# Patient Record
Sex: MALE | Race: WHITE | NOT HISPANIC OR LATINO | Employment: UNEMPLOYED | URBAN - METROPOLITAN AREA
[De-identification: names, ages, dates, MRNs, and addresses within clinical notes are randomized per-mention and may not be internally consistent; named-entity substitution may affect disease eponyms.]

---

## 2018-04-05 ENCOUNTER — HOSPITAL ENCOUNTER (INPATIENT)
Facility: HOSPITAL | Age: 58
LOS: 3 days | Discharge: HOME | DRG: 580 | End: 2018-04-08
Attending: EMERGENCY MEDICINE | Admitting: EMERGENCY MEDICINE

## 2018-04-05 ENCOUNTER — TELEPHONE (OUTPATIENT)
Dept: UROLOGY | Facility: CLINIC | Age: 58
End: 2018-04-05

## 2018-04-05 DIAGNOSIS — L02.214 ABSCESS OF GROIN, LEFT: ICD-10-CM

## 2018-04-05 DIAGNOSIS — L03.113 CELLULITIS OF RIGHT ARM: ICD-10-CM

## 2018-04-05 DIAGNOSIS — L03.113 CELLULITIS OF RIGHT FOREARM: ICD-10-CM

## 2018-04-05 DIAGNOSIS — L02.413 ABSCESS OF RIGHT FOREARM: Primary | ICD-10-CM

## 2018-04-05 DIAGNOSIS — L03.314 CELLULITIS OF GROIN: ICD-10-CM

## 2018-04-05 PROBLEM — Z72.0 TOBACCO ABUSE: Status: ACTIVE | Noted: 2018-04-05

## 2018-04-05 PROBLEM — N40.0 ENLARGED PROSTATE: Status: ACTIVE | Noted: 2018-04-05

## 2018-04-05 PROBLEM — K76.0 HEPATIC STEATOSIS: Status: ACTIVE | Noted: 2018-04-05

## 2018-04-05 PROBLEM — B18.2 CHRONIC HEPATITIS C VIRUS INFECTION: Status: ACTIVE | Noted: 2018-04-05

## 2018-04-05 PROBLEM — I10 ESSENTIAL HYPERTENSION: Status: ACTIVE | Noted: 2018-04-05

## 2018-04-05 PROBLEM — N32.89 BLADDER WALL THICKENING: Status: ACTIVE | Noted: 2018-04-05

## 2018-04-05 LAB
ALBUMIN SERPL BCP-MCNC: 4 G/DL
ALP SERPL-CCNC: 103 U/L
ALT SERPL W/O P-5'-P-CCNC: 50 U/L
ANION GAP SERPL CALC-SCNC: 7 MMOL/L
APTT BLDCRRT: 28.1 SEC
AST SERPL-CCNC: 44 U/L
BASOPHILS # BLD AUTO: 0.01 K/UL
BASOPHILS NFR BLD: 0.1 %
BILIRUB SERPL-MCNC: 1 MG/DL
BILIRUB UR QL STRIP: NEGATIVE
BUN SERPL-MCNC: 9 MG/DL
CALCIUM SERPL-MCNC: 9.7 MG/DL
CHLORIDE SERPL-SCNC: 101 MMOL/L
CLARITY UR: CLEAR
CO2 SERPL-SCNC: 30 MMOL/L
COLOR UR: YELLOW
CREAT SERPL-MCNC: 0.8 MG/DL
CRP SERPL-MCNC: 8.7 MG/L
DIFFERENTIAL METHOD: ABNORMAL
EOSINOPHIL # BLD AUTO: 0.1 K/UL
EOSINOPHIL NFR BLD: 1.3 %
ERYTHROCYTE [DISTWIDTH] IN BLOOD BY AUTOMATED COUNT: 12.6 %
ERYTHROCYTE [SEDIMENTATION RATE] IN BLOOD BY WESTERGREN METHOD: 11 MM/HR
EST. GFR  (AFRICAN AMERICAN): >60 ML/MIN/1.73 M^2
EST. GFR  (NON AFRICAN AMERICAN): >60 ML/MIN/1.73 M^2
GLUCOSE SERPL-MCNC: 103 MG/DL
GLUCOSE UR QL STRIP: NEGATIVE
HCT VFR BLD AUTO: 45.4 %
HGB BLD-MCNC: 15.4 G/DL
HGB UR QL STRIP: NEGATIVE
HIV1+2 IGG SERPL QL IA.RAPID: NEGATIVE
INR PPP: 1
KETONES UR QL STRIP: NEGATIVE
LEUKOCYTE ESTERASE UR QL STRIP: NEGATIVE
LYMPHOCYTES # BLD AUTO: 1.6 K/UL
LYMPHOCYTES NFR BLD: 20.6 %
MCH RBC QN AUTO: 32.9 PG
MCHC RBC AUTO-ENTMCNC: 33.9 G/DL
MCV RBC AUTO: 97 FL
MONOCYTES # BLD AUTO: 0.6 K/UL
MONOCYTES NFR BLD: 7.4 %
NEUTROPHILS # BLD AUTO: 5.6 K/UL
NEUTROPHILS NFR BLD: 70.5 %
NITRITE UR QL STRIP: NEGATIVE
PH UR STRIP: 7 [PH] (ref 5–8)
PLATELET # BLD AUTO: 251 K/UL
PMV BLD AUTO: 10.1 FL
POTASSIUM SERPL-SCNC: 4.4 MMOL/L
PROT SERPL-MCNC: 8.2 G/DL
PROT UR QL STRIP: NEGATIVE
PROTHROMBIN TIME: 10.7 SEC
RBC # BLD AUTO: 4.68 M/UL
SODIUM SERPL-SCNC: 138 MMOL/L
SP GR UR STRIP: 1.01 (ref 1–1.03)
URN SPEC COLLECT METH UR: ABNORMAL
UROBILINOGEN UR STRIP-ACNC: ABNORMAL EU/DL
WBC # BLD AUTO: 7.96 K/UL

## 2018-04-05 PROCEDURE — 25500020 PHARM REV CODE 255: Performed by: EMERGENCY MEDICINE

## 2018-04-05 PROCEDURE — 25000003 PHARM REV CODE 250: Performed by: HOSPITALIST

## 2018-04-05 PROCEDURE — 87186 SC STD MICRODIL/AGAR DIL: CPT

## 2018-04-05 PROCEDURE — 96365 THER/PROPH/DIAG IV INF INIT: CPT

## 2018-04-05 PROCEDURE — 87086 URINE CULTURE/COLONY COUNT: CPT

## 2018-04-05 PROCEDURE — 25000003 PHARM REV CODE 250: Performed by: UROLOGY

## 2018-04-05 PROCEDURE — 81003 URINALYSIS AUTO W/O SCOPE: CPT

## 2018-04-05 PROCEDURE — 25000003 PHARM REV CODE 250: Performed by: PHYSICIAN ASSISTANT

## 2018-04-05 PROCEDURE — 96366 THER/PROPH/DIAG IV INF ADDON: CPT

## 2018-04-05 PROCEDURE — 96375 TX/PRO/DX INJ NEW DRUG ADDON: CPT

## 2018-04-05 PROCEDURE — 63600175 PHARM REV CODE 636 W HCPCS: Performed by: HOSPITALIST

## 2018-04-05 PROCEDURE — 85025 COMPLETE CBC W/AUTO DIFF WBC: CPT

## 2018-04-05 PROCEDURE — 85610 PROTHROMBIN TIME: CPT

## 2018-04-05 PROCEDURE — 86140 C-REACTIVE PROTEIN: CPT

## 2018-04-05 PROCEDURE — 10061 I&D ABSCESS COMP/MULTIPLE: CPT

## 2018-04-05 PROCEDURE — 87070 CULTURE OTHR SPECIMN AEROBIC: CPT

## 2018-04-05 PROCEDURE — 85652 RBC SED RATE AUTOMATED: CPT

## 2018-04-05 PROCEDURE — 99285 EMERGENCY DEPT VISIT HI MDM: CPT | Mod: 25

## 2018-04-05 PROCEDURE — 80053 COMPREHEN METABOLIC PANEL: CPT

## 2018-04-05 PROCEDURE — 85730 THROMBOPLASTIN TIME PARTIAL: CPT

## 2018-04-05 PROCEDURE — 11000001 HC ACUTE MED/SURG PRIVATE ROOM

## 2018-04-05 PROCEDURE — 63600175 PHARM REV CODE 636 W HCPCS: Performed by: PHYSICIAN ASSISTANT

## 2018-04-05 PROCEDURE — 86703 HIV-1/HIV-2 1 RESULT ANTBDY: CPT

## 2018-04-05 PROCEDURE — 83036 HEMOGLOBIN GLYCOSYLATED A1C: CPT

## 2018-04-05 PROCEDURE — 99254 IP/OBS CNSLTJ NEW/EST MOD 60: CPT | Mod: ,,, | Performed by: UROLOGY

## 2018-04-05 PROCEDURE — 87077 CULTURE AEROBIC IDENTIFY: CPT

## 2018-04-05 PROCEDURE — 87040 BLOOD CULTURE FOR BACTERIA: CPT

## 2018-04-05 PROCEDURE — S4991 NICOTINE PATCH NONLEGEND: HCPCS | Performed by: HOSPITALIST

## 2018-04-05 RX ORDER — AMOXICILLIN 250 MG
1 CAPSULE ORAL 2 TIMES DAILY
Status: DISCONTINUED | OUTPATIENT
Start: 2018-04-05 | End: 2018-04-08

## 2018-04-05 RX ORDER — ONDANSETRON 8 MG/1
8 TABLET, ORALLY DISINTEGRATING ORAL EVERY 8 HOURS PRN
Status: DISCONTINUED | OUTPATIENT
Start: 2018-04-05 | End: 2018-04-08 | Stop reason: HOSPADM

## 2018-04-05 RX ORDER — MORPHINE SULFATE 10 MG/ML
4 INJECTION INTRAMUSCULAR; INTRAVENOUS; SUBCUTANEOUS
Status: COMPLETED | OUTPATIENT
Start: 2018-04-05 | End: 2018-04-05

## 2018-04-05 RX ORDER — IBUPROFEN 200 MG
16 TABLET ORAL
Status: DISCONTINUED | OUTPATIENT
Start: 2018-04-05 | End: 2018-04-08 | Stop reason: HOSPADM

## 2018-04-05 RX ORDER — MORPHINE SULFATE 4 MG/ML
4 INJECTION, SOLUTION INTRAMUSCULAR; INTRAVENOUS EVERY 4 HOURS PRN
Status: DISCONTINUED | OUTPATIENT
Start: 2018-04-05 | End: 2018-04-06

## 2018-04-05 RX ORDER — ONDANSETRON 8 MG/1
8 TABLET, ORALLY DISINTEGRATING ORAL EVERY 8 HOURS PRN
Status: DISCONTINUED | OUTPATIENT
Start: 2018-04-05 | End: 2018-04-06

## 2018-04-05 RX ORDER — IBUPROFEN 200 MG
24 TABLET ORAL
Status: DISCONTINUED | OUTPATIENT
Start: 2018-04-05 | End: 2018-04-08 | Stop reason: HOSPADM

## 2018-04-05 RX ORDER — LIDOCAINE HYDROCHLORIDE AND EPINEPHRINE 10; 10 MG/ML; UG/ML
10 INJECTION, SOLUTION INFILTRATION; PERINEURAL
Status: COMPLETED | OUTPATIENT
Start: 2018-04-05 | End: 2018-04-05

## 2018-04-05 RX ORDER — GLUCAGON 1 MG
1 KIT INJECTION
Status: DISCONTINUED | OUTPATIENT
Start: 2018-04-05 | End: 2018-04-08 | Stop reason: HOSPADM

## 2018-04-05 RX ORDER — SODIUM CHLORIDE 0.9 % (FLUSH) 0.9 %
5 SYRINGE (ML) INJECTION
Status: DISCONTINUED | OUTPATIENT
Start: 2018-04-05 | End: 2018-04-08 | Stop reason: HOSPADM

## 2018-04-05 RX ORDER — RAMELTEON 8 MG/1
8 TABLET ORAL NIGHTLY PRN
Status: DISCONTINUED | OUTPATIENT
Start: 2018-04-05 | End: 2018-04-08 | Stop reason: HOSPADM

## 2018-04-05 RX ORDER — ACETAMINOPHEN 325 MG/1
650 TABLET ORAL EVERY 8 HOURS PRN
Status: DISCONTINUED | OUTPATIENT
Start: 2018-04-05 | End: 2018-04-08 | Stop reason: HOSPADM

## 2018-04-05 RX ORDER — KETOROLAC TROMETHAMINE 30 MG/ML
15 INJECTION, SOLUTION INTRAMUSCULAR; INTRAVENOUS EVERY 6 HOURS PRN
Status: DISCONTINUED | OUTPATIENT
Start: 2018-04-05 | End: 2018-04-08 | Stop reason: HOSPADM

## 2018-04-05 RX ORDER — VANCOMYCIN HCL IN 5 % DEXTROSE 1G/250ML
1000 PLASTIC BAG, INJECTION (ML) INTRAVENOUS
Status: COMPLETED | OUTPATIENT
Start: 2018-04-05 | End: 2018-04-05

## 2018-04-05 RX ORDER — HYDROMORPHONE HYDROCHLORIDE 1 MG/ML
0.5 INJECTION, SOLUTION INTRAMUSCULAR; INTRAVENOUS; SUBCUTANEOUS EVERY 6 HOURS PRN
Status: DISCONTINUED | OUTPATIENT
Start: 2018-04-05 | End: 2018-04-05

## 2018-04-05 RX ORDER — TAMSULOSIN HYDROCHLORIDE 0.4 MG/1
0.4 CAPSULE ORAL DAILY
Status: DISCONTINUED | OUTPATIENT
Start: 2018-04-05 | End: 2018-04-08 | Stop reason: HOSPADM

## 2018-04-05 RX ORDER — IBUPROFEN 600 MG/1
600 TABLET ORAL EVERY 6 HOURS PRN
Status: DISCONTINUED | OUTPATIENT
Start: 2018-04-05 | End: 2018-04-08 | Stop reason: HOSPADM

## 2018-04-05 RX ORDER — POLYETHYLENE GLYCOL 3350 17 G/17G
17 POWDER, FOR SOLUTION ORAL DAILY
Status: DISCONTINUED | OUTPATIENT
Start: 2018-04-06 | End: 2018-04-08

## 2018-04-05 RX ORDER — ACETAMINOPHEN 325 MG/1
650 TABLET ORAL EVERY 4 HOURS PRN
Status: DISCONTINUED | OUTPATIENT
Start: 2018-04-05 | End: 2018-04-08 | Stop reason: HOSPADM

## 2018-04-05 RX ORDER — HYDROCODONE BITARTRATE AND ACETAMINOPHEN 10; 325 MG/1; MG/1
1 TABLET ORAL EVERY 6 HOURS PRN
Status: DISCONTINUED | OUTPATIENT
Start: 2018-04-05 | End: 2018-04-06

## 2018-04-05 RX ORDER — IBUPROFEN 200 MG
1 TABLET ORAL DAILY
Status: DISCONTINUED | OUTPATIENT
Start: 2018-04-05 | End: 2018-04-08 | Stop reason: HOSPADM

## 2018-04-05 RX ORDER — PROMETHAZINE HYDROCHLORIDE 25 MG/1
25 SUPPOSITORY RECTAL EVERY 6 HOURS PRN
Status: DISCONTINUED | OUTPATIENT
Start: 2018-04-05 | End: 2018-04-08 | Stop reason: HOSPADM

## 2018-04-05 RX ORDER — TRAMADOL HYDROCHLORIDE 50 MG/1
50 TABLET ORAL EVERY 6 HOURS PRN
Status: DISCONTINUED | OUTPATIENT
Start: 2018-04-05 | End: 2018-04-06

## 2018-04-05 RX ADMIN — HYDROCODONE BITARTRATE AND ACETAMINOPHEN 1 TABLET: 10; 325 TABLET ORAL at 09:04

## 2018-04-05 RX ADMIN — NICOTINE 1 PATCH: 21 PATCH, EXTENDED RELEASE TRANSDERMAL at 06:04

## 2018-04-05 RX ADMIN — MORPHINE SULFATE 4 MG: 10 INJECTION INTRAVENOUS at 11:04

## 2018-04-05 RX ADMIN — VANCOMYCIN HYDROCHLORIDE 1250 MG: 1 INJECTION, POWDER, LYOPHILIZED, FOR SOLUTION INTRAVENOUS at 11:04

## 2018-04-05 RX ADMIN — TAMSULOSIN HYDROCHLORIDE 0.4 MG: 0.4 CAPSULE ORAL at 06:04

## 2018-04-05 RX ADMIN — DOCUSATE SODIUM AND SENNOSIDES 1 TABLET: 8.6; 5 TABLET, FILM COATED ORAL at 08:04

## 2018-04-05 RX ADMIN — HYDROCODONE BITARTRATE AND ACETAMINOPHEN 1 TABLET: 10; 325 TABLET ORAL at 05:04

## 2018-04-05 RX ADMIN — VANCOMYCIN HYDROCHLORIDE 1000 MG: 1 INJECTION, POWDER, LYOPHILIZED, FOR SOLUTION INTRAVENOUS at 11:04

## 2018-04-05 RX ADMIN — IOHEXOL 75 ML: 350 INJECTION, SOLUTION INTRAVENOUS at 02:04

## 2018-04-05 RX ADMIN — LIDOCAINE HYDROCHLORIDE AND EPINEPHRINE 10 ML: 10; 10 INJECTION, SOLUTION INFILTRATION; PERINEURAL at 10:04

## 2018-04-05 NOTE — ASSESSMENT & PLAN NOTE
Noted on CT scan and with abnormal LFTS due to Hep C  Pt and family counseled on findings and educated on need to f/u with GI to treat Hep C as outpatient  Will follow LFTs

## 2018-04-05 NOTE — SUBJECTIVE & OBJECTIVE
Past Medical History:   Diagnosis Date    Abscess     Rt forearm    Cigarette smoker one half pack a day or less     Hypertension        Past Surgical History:   Procedure Laterality Date    ABCESS DRAINAGE Right 04/05/2018    forearm       Review of patient's allergies indicates:  No Known Allergies    Family History     None          Social History Main Topics    Smoking status: Current Every Day Smoker     Packs/day: 0.50     Types: Cigarettes    Smokeless tobacco: Never Used    Alcohol use Yes    Drug use: No    Sexual activity: Yes     Partners: Female     Birth control/ protection: None       Review of Systems   Constitutional: Negative for chills and fever.   HENT: Negative for hearing loss, sore throat and trouble swallowing.    Eyes: Negative.    Respiratory: Negative for cough and shortness of breath.    Cardiovascular: Negative for chest pain.   Gastrointestinal: Negative for abdominal distention, abdominal pain, constipation, diarrhea, nausea and vomiting.   Genitourinary: Positive for frequency, nocturia, penile pain and penile swelling. Negative for difficulty urinating and hematuria.   Musculoskeletal: Negative for arthralgias and neck pain.   Skin: Negative for color change, pallor and rash.   Neurological: Negative for dizziness and seizures.   Hematological: Negative for adenopathy.   Psychiatric/Behavioral: Negative for confusion.   All other systems reviewed and are negative.      Objective:     Temp:  [97.9 °F (36.6 °C)-98.6 °F (37 °C)] 97.9 °F (36.6 °C)  Pulse:  [55-66] 61  Resp:  [12-20] 18  SpO2:  [97 %-98 %] 98 %  BP: (112-149)/(60-76) 149/72     Body mass index is 26.58 kg/m².            Drains          No matching active lines, drains, or airways          Physical Exam   Vitals reviewed.  Constitutional: He is oriented to person, place, and time. He appears well-developed and well-nourished. No distress.   HENT:   Head: Normocephalic and atraumatic.   Poor dentition   Eyes: Right  eye exhibits no discharge. Left eye exhibits no discharge. No scleral icterus.   Neck: Normal range of motion. Neck supple.   Cardiovascular: Normal rate and regular rhythm.    Pulmonary/Chest: Effort normal and breath sounds normal. No respiratory distress.   Abdominal: Soft. Bowel sounds are normal. He exhibits no distension. There is no tenderness. There is no rebound and no guarding.   Genitourinary:   Genitourinary Comments: Erythema around area of base of penis and L groin. Small scab near base of penis. Indurated but not fluctuant. No crepitus. No drainage.    Musculoskeletal: Normal range of motion.   Dressing on R forearm   Neurological: He is alert and oriented to person, place, and time.   Skin: Skin is warm and dry. He is not diaphoretic. No erythema.         Significant Labs:    BMP:    Recent Labs  Lab 04/05/18  1000      K 4.4      CO2 30*   BUN 9   CREATININE 0.8   CALCIUM 9.7       CBC:    Recent Labs  Lab 04/05/18  1000   WBC 7.96   HGB 15.4   HCT 45.4          Blood Culture:   Recent Labs  Lab 04/05/18  1002 04/05/18  1018   LABBLOO No Growth to date No Growth to date     Urine Culture: No results for input(s): LABURIN in the last 168 hours.  Urine Studies:   Recent Labs  Lab 04/05/18  0949   COLORU Yellow   APPEARANCEUA Clear   PHUR 7.0   SPECGRAV 1.015   PROTEINUA Negative   GLUCUA Negative   KETONESU Negative   BILIRUBINUA Negative   OCCULTUA Negative   NITRITE Negative   UROBILINOGEN 2.0-3.0*   LEUKOCYTESUR Negative       Significant Imaging:  All pertinent imaging results/findings from the past 24 hours have been reviewed.

## 2018-04-05 NOTE — ASSESSMENT & PLAN NOTE
- Suspect 2/2 WEAVER due to enlarged prostate   - May be infectious. UCx ordered.   - Start Flomax   - Will need PSA/NICO. PSA in AM - may be falsely elevated if UTI present

## 2018-04-05 NOTE — ED PROVIDER NOTES
Encounter Date: 4/5/2018    SCRIBE #1 NOTE: ICathy, am scribing for, and in the presence of,  Brenda Rao PA-C. I have scribed the following portions of the note - Other sections scribed: HPI and ROS.       History     Chief Complaint   Patient presents with    Abscess     right forearm and left groin x 1 week.     CC: Abscess    HPI: This 58 y.o male who has hypertension and recurrent abscess presents to the ED for an evaluation of acute, constant, 8/10 abscess to her right forearm that began 1 week ago.  Patient also reports of an abscess to this left groin that he noticed 4 days ago.  Patient reports squeezing the abscess to his right forearm and reports being able to express blood as well as pus-like drainage.  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  Patient denies penile discharge, penile pain, fever, chills, nausea, emesis, diarrhea, abdominal pain, back pain, or any other associated symptoms.  No other prior tx.  No alleviating factors.      The history is provided by the patient. No  was used.     Review of patient's allergies indicates:  Allergies not on file  Past Medical History:   Diagnosis Date    Abscess     Hypertension      History reviewed. No pertinent surgical history.  History reviewed. No pertinent family history.  Social History   Substance Use Topics    Smoking status: Current Every Day Smoker     Types: Cigarettes    Smokeless tobacco: Never Used    Alcohol use Yes     Review of Systems   Constitutional: Negative for chills, diaphoresis, fatigue and fever.   HENT: Negative for congestion, ear pain and sore throat.    Eyes: Negative for pain.   Respiratory: Negative for cough and shortness of breath.    Cardiovascular: Negative for chest pain.   Gastrointestinal: Negative for abdominal pain, diarrhea, nausea and vomiting.   Genitourinary: Negative for dysuria.    Musculoskeletal: Negative for back pain and neck pain.        (-) arm or leg problems   Skin: Negative for rash.        (+) abscess   Neurological: Negative for headaches.   Psychiatric/Behavioral: Negative for confusion. The patient is not nervous/anxious.        Physical Exam     Initial Vitals [04/05/18 0855]   BP Pulse Resp Temp SpO2   (!) 144/69 65 16 97.9 °F (36.6 °C) 97 %      MAP       94         Physical Exam    Nursing note and vitals reviewed.  Constitutional: Vital signs are normal. He appears well-developed and well-nourished. He is not diaphoretic. He is cooperative.  Non-toxic appearance. He does not have a sickly appearance. He does not appear ill. No distress.   HENT:   Head: Normocephalic and atraumatic.   Right Ear: Tympanic membrane, external ear and ear canal normal.   Left Ear: Tympanic membrane, external ear and ear canal normal.   Nose: Nose normal.   Mouth/Throat: Uvula is midline, oropharynx is clear and moist and mucous membranes are normal. No trismus in the jaw. No uvula swelling. No oropharyngeal exudate, posterior oropharyngeal edema or posterior oropharyngeal erythema.   Eyes: Conjunctivae, EOM and lids are normal. Pupils are equal, round, and reactive to light.   Neck: Trachea normal, normal range of motion, full passive range of motion without pain and phonation normal. Neck supple.   Cardiovascular: Normal rate, regular rhythm, normal heart sounds and intact distal pulses. Exam reveals no gallop and no friction rub.    No murmur heard.  Pulses:       Radial pulses are 2+ on the right side, and 2+ on the left side.   Capillary refill less than 2 seconds in bilateral upper extremities.   Pulmonary/Chest: Effort normal and breath sounds normal. No respiratory distress. He has no decreased breath sounds. He has no wheezes. He has no rhonchi. He has no rales.   Abdominal: Soft. Normal appearance and bowel sounds are normal. He exhibits no distension. There is no tenderness. There is  no rigidity, no rebound, no guarding and no CVA tenderness.   Genitourinary: Testes normal and penis normal. Circumcised.   Musculoskeletal: Normal range of motion.        Right elbow: Normal.       Right wrist: Normal.        Right forearm: He exhibits no bony tenderness, no swelling, no edema, no deformity and no laceration.   Lymphadenopathy: Inguinal adenopathy noted on the left side. No inguinal adenopathy noted on the right side.   Neurological: He is alert and oriented to person, place, and time. He has normal strength. No cranial nerve deficit or sensory deficit. GCS eye subscore is 4. GCS verbal subscore is 5. GCS motor subscore is 6.   Skin: Skin is warm and dry. Capillary refill takes less than 2 seconds. Abscess noted. No rash noted. No pallor.        There is an area of induration with fluctuance of the right anterior distal forearm that is draining. There is associated tenderness to palpation of the right forearm. No streaking erythema.    There is an area of induration of the left mons pubis that travels to the base of the penile shaft with associated tenderness to palpation and left inguinal adenopathy.    Psychiatric: He has a normal mood and affect. His speech is normal and behavior is normal. Judgment and thought content normal. Cognition and memory are normal.                 ED Course   I & D - Incision and Drainage  Date/Time: 4/5/2018 11:26 AM  Performed by: DAVON GRAY  Authorized by: ADRIANNE DEL REAL   Consent Done: Yes  Consent: Verbal consent obtained.  Risks and benefits: risks, benefits and alternatives were discussed  Consent given by: patient  Patient understanding: patient states understanding of the procedure being performed  Patient consent: the patient's understanding of the procedure matches consent given  Patient identity confirmed: verbally with patient  Type: abscess  Body area: upper extremity  Location details: right arm  Anesthesia: local  infiltration    Anesthesia:  Local Anesthetic: lidocaine 1% with epinephrine  Anesthetic total: 9 mL  Patient sedated: no  Scalpel size: 11  Incision type: single straight  Complexity: complex  Drainage: bloody and  purulent  Drainage amount: moderate  Wound treatment: incision,  drainage and  wound packed  Packing material: 1/2 in gauze  Patient tolerance: Patient tolerated the procedure well with no immediate complications        Labs Reviewed   CBC W/ AUTO DIFFERENTIAL - Abnormal; Notable for the following:        Result Value    MCH 32.9 (*)     All other components within normal limits   COMPREHENSIVE METABOLIC PANEL - Abnormal; Notable for the following:     CO2 30 (*)     AST 44 (*)     ALT 50 (*)     Anion Gap 7 (*)     All other components within normal limits   URINALYSIS - Abnormal; Notable for the following:     Urobilinogen, UA 2.0-3.0 (*)     All other components within normal limits   SEDIMENTATION RATE, MANUAL - Abnormal; Notable for the following:     Sed Rate 11 (*)     All other components within normal limits   C-REACTIVE PROTEIN - Abnormal; Notable for the following:     CRP 8.7 (*)     All other components within normal limits   CULTURE, AEROBIC  (SPECIFY SOURCE)   CULTURE, BLOOD   CULTURE, BLOOD   CULTURE, URINE   PROTIME-INR   APTT   RAPID HIV   HEMOGLOBIN A1C             Medical Decision Making:   Initial Assessment:   This is an evaluation of a 58 y.o. male with HTN that presents to the Emergency Department for abscess.  Pt reports abscess on the right forearm that began 1 week ago. He reports another abscess in the left groin that began 3-4 days ago. He reports that the abscess on the right forearm and groin are enlarging and mildly painful. He denies any fever, chills, diaphoresis, SOB, chest pain, dysuria, penile lesions, penile discharge or further symptoms. He denies any attempted tx prior to arrival. Patient denies any IV drug use.    Clinical Tests:   Lab Tests: Ordered and  Reviewed  ED Management:  Physical Exam shows a non-toxic, afebrile, and well appearing male. There is an area of induration with fluctuance of the right anterior distal forearm with purulent drainage. There is associated tenderness to palpation of the right forearm. No streaking erythema. Penis and scrotum appear normal; no rash, lesions, pustules, papules or vesicles. There is an area of induration of the left mons pubis that travels to the base of the penile shaft with associated tenderness to palpation and left inguinal adenopathy.     Vital Signs Are Reassuring. If available, previous records reviewed.   RESULTS: CBC unrevealing for leukocytosis (WBC 7.96).  CMP shows mildly elevated liver function (AST 44, ALT 50).  PT 10.7, INR 1.0, APTT 28.1  ESR 11, CRP 8.7  UA unrevealing.   Blood cultures pending.  Aerobic culture of the right forearm pending.     My overall impression is Abscess with cellulitis of the right forearm and Abscess with cellulitis of the left groin. I considered, but at this time, do not suspect sepsis, cyst, hidradenitis suppurativa.    ED Course: I&D performed on the right forearm (see procedure note); Morphine 4 mg IV given for pain control. Patient reports pain controlled. Vancomycin 1 g IV initiated in ED. I spoke with Dr. Dougherty's resident who declined surgical consult and recommended consulting urology, based on the induration of the mons pubis that extends to the base of the penis. I consulted Dr. Mccullough with urology who recommended a CT of the pelvis which showed cellulitis of the mons pubis but no abscess at this time. I spoke with Dr. Vivian Burton with [facility] medicine who agreed to accept this patient for IV antibiotics and observation. Dr. Mccullough reports that she is happy for re-consult as needed if an abscess develops and surgical intervention is needed. The diagnosis, treatment plan and plans for admission were discussed with this patient.         Other:   I have  discussed this case with another health care provider.       <> Summary of the Discussion: This case was discussed with and the patient has been examined by Dr. Escobedo who is in agreement with my assessment and plan.   Brenda Rao PA-C              Scribe Attestation:   Scribe #1: I performed the above scribed service and the documentation accurately describes the services I performed. I attest to the accuracy of the note.    Attending Attestation:           Physician Attestation for Scribe:  Physician Attestation Statement for Scribe #1: I, Brenda Rao PA-C, reviewed documentation, as scribed by Cathy Sales in my presence, and it is both accurate and complete.                    Clinical Impression:   The primary encounter diagnosis was Abscess of right forearm. Diagnoses of Cellulitis of right forearm and Cellulitis of groin were also pertinent to this visit.    Disposition:   Disposition: Admitted                        Brenda Rao PA-C  04/05/18 4069

## 2018-04-05 NOTE — ASSESSMENT & PLAN NOTE
Pt with abscess s/p I&D done in the ER with surrounding cellulitis  Does not meet criteria for sepsis  Treat empirically with Vancomycin  Will f/u on wound and blood cultures

## 2018-04-05 NOTE — ASSESSMENT & PLAN NOTE
- No obvious abscess at this time    - Suspect that cellulitis will develop abscess in near future that would need I&D   - NPO at [facility]   - Serial exams

## 2018-04-05 NOTE — CONSULTS
[facility]  Urology  Consult Note    Patient Name: Jose Morales  MRN: [id number]  Admission Date: 4/5/2018  Hospital Length of Stay: 0   Code Status: Full Code   Attending Provider: Kamila Burton MD   Consulting Provider: Mellisa Mccullough MD  Primary Care Physician: Primary Doctor No  Principal Problem:Cellulitis of right arm    Inpatient consult to Urology  Consult performed by: MELLISA MCCULLOUGH  Consult ordered by: DAVON GRAY  Reason for consult: Groin cellulitis  Assessment/Recommendations: Enlarged prostate   - Significant LUTS   - Started Flomax now   - Check PVR   - PSA   - May need TURP in future if limited improvement with pharmacotherapy      Bladder wall thickening   - Suspect 2/2 WEAVER due to enlarged prostate   - May be infectious. UCx ordered.   - Start Flomax   - Will need PSA/NICO. PSA in AM - may be falsely elevated if UTI present      Cellulitis of groin   - No obvious abscess at this time    - Suspect that cellulitis will develop abscess in near future that would need I&D   - NPO at [facility]   - Serial exams            Subjective:     HPI:  59yo M presented to ER with abscess on R arm and infection in groin area. He reports h/o recurrent abscess formation. R forearm abscess noted x 1 week with drainage with manipulation prior to admission. L groin swelling noted about 4 days ago. No drainage from groin area. 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 Denies fevers.     He reports significant LUTS that he has not sought care. Nocturia x 3-4 with weak stream. No known fam hx of PCa. No prior PCa screening.     CT scan - no obvious fluid collection in L groin to midline around penis, edema noted in soft tissues. No air in area of infection. Bladder moderately distended, no hydronephrosis. Large prostate.     Past Medical History:   Diagnosis Date    Abscess     Rt forearm    Cigarette smoker one half pack a day or less     Hypertension        Past Surgical  History:   Procedure Laterality Date    ABCESS DRAINAGE Right 04/05/2018    forearm       Review of patient's allergies indicates:  No Known Allergies    Family History     None          Social History Main Topics    Smoking status: Current Every Day Smoker     Packs/day: 0.50     Types: Cigarettes    Smokeless tobacco: Never Used    Alcohol use Yes    Drug use: No    Sexual activity: Yes     Partners: Female     Birth control/ protection: None       Review of Systems   Constitutional: Negative for chills and fever.   HENT: Negative for hearing loss, sore throat and trouble swallowing.    Eyes: Negative.    Respiratory: Negative for cough and shortness of breath.    Cardiovascular: Negative for chest pain.   Gastrointestinal: Negative for abdominal distention, abdominal pain, constipation, diarrhea, nausea and vomiting.   Genitourinary: Positive for frequency, nocturia, penile pain and penile swelling. Negative for difficulty urinating and hematuria.   Musculoskeletal: Negative for arthralgias and neck pain.   Skin: Negative for color change, pallor and rash.   Neurological: Negative for dizziness and seizures.   Hematological: Negative for adenopathy.   Psychiatric/Behavioral: Negative for confusion.   All other systems reviewed and are negative.      Objective:     Temp:  [97.9 °F (36.6 °C)-98.6 °F (37 °C)] 97.9 °F (36.6 °C)  Pulse:  [55-66] 61  Resp:  [12-20] 18  SpO2:  [97 %-98 %] 98 %  BP: (112-149)/(60-76) 149/72     Body mass index is 26.58 kg/m².            Drains          No matching active lines, drains, or airways          Physical Exam   Vitals reviewed.  Constitutional: He is oriented to person, place, and time. He appears well-developed and well-nourished. No distress.   HENT:   Head: Normocephalic and atraumatic.   Poor dentition   Eyes: Right eye exhibits no discharge. Left eye exhibits no discharge. No scleral icterus.   Neck: Normal range of motion. Neck supple.   Cardiovascular: Normal rate  and regular rhythm.    Pulmonary/Chest: Effort normal and breath sounds normal. No respiratory distress.   Abdominal: Soft. Bowel sounds are normal. He exhibits no distension. There is no tenderness. There is no rebound and no guarding.   Genitourinary:   Genitourinary Comments: Erythema around area of base of penis and L groin. Small scab near base of penis. Indurated but not fluctuant. No crepitus. No drainage.    Musculoskeletal: Normal range of motion.   Dressing on R forearm   Neurological: He is alert and oriented to person, place, and time.   Skin: Skin is warm and dry. He is not diaphoretic. No erythema.         Significant Labs:    BMP:    Recent Labs  Lab 04/05/18  1000      K 4.4      CO2 30*   BUN 9   CREATININE 0.8   CALCIUM 9.7       CBC:    Recent Labs  Lab 04/05/18  1000   WBC 7.96   HGB 15.4   HCT 45.4          Blood Culture:   Recent Labs  Lab 04/05/18  1002 04/05/18  1018   LABBLOO No Growth to date No Growth to date     Urine Culture: No results for input(s): LABURIN in the last 168 hours.  Urine Studies:   Recent Labs  Lab 04/05/18  0949   COLORU Yellow   APPEARANCEUA Clear   PHUR 7.0   SPECGRAV 1.015   PROTEINUA Negative   GLUCUA Negative   KETONESU Negative   BILIRUBINUA Negative   OCCULTUA Negative   NITRITE Negative   UROBILINOGEN 2.0-3.0*   LEUKOCYTESUR Negative       Significant Imaging:  All pertinent imaging results/findings from the past 24 hours have been reviewed.        Assessment and Plan:     Enlarged prostate     - Significant LUTS   - Started Flomax now   - Check PVR   - PSA   - May need TURP in future if limited improvement with pharmacotherapy        Bladder wall thickening     - Suspect 2/2 EWAVER due to enlarged prostate   - May be infectious. UCx ordered.   - Start Flomax   - Will need PSA/NICO. PSA in AM - may be falsely elevated if UTI present        Cellulitis of groin     - No obvious abscess at this time    - Suspect that cellulitis will develop abscess  in near future that would need I&D   - NPO at [facility]   - Serial exams            VTE Risk Mitigation         Ordered     IP VTE LOW RISK PATIENT  Once      04/05/18 1647     Place SARAH hose  Until discontinued      04/05/18 1647     Place sequential compression device  Until discontinued      04/05/18 1647          Thank you for your consult. I will follow-up with patient. Please contact us if you have any additional questions.    Mellisa Mccullough MD  Urology  [facility]

## 2018-04-05 NOTE — ASSESSMENT & PLAN NOTE
Pt with progressive symptoms of dysuria with outlet obstruction  Likely symptomatic BPH  Urology already consulted  Will check PSA level and tamulosin started  Will need outpatient Urology follow upon discharge

## 2018-04-05 NOTE — ASSESSMENT & PLAN NOTE
Imaging with CT not consistent with abscess, but just cellulitis  Treatment with Vancomycin as discussed above  Urology has been consulted and will follow and monitor to determine if abscess will form

## 2018-04-05 NOTE — SUBJECTIVE & OBJECTIVE
Past Medical History:   Diagnosis Date    Abscess     Rt forearm    Cigarette smoker one half pack a day or less     Hypertension        Past Surgical History:   Procedure Laterality Date    ABCESS DRAINAGE Right 04/05/2018    forearm       Review of patient's allergies indicates:  No Known Allergies    No current facility-administered medications on file prior to encounter.      No current outpatient prescriptions on file prior to encounter.     Family History     None        Social History Main Topics    Smoking status: Current Every Day Smoker     Packs/day: 0.50     Types: Cigarettes    Smokeless tobacco: Never Used    Alcohol use Yes    Drug use: No    Sexual activity: Yes     Partners: Female     Birth control/ protection: None     Review of Systems   Constitutional: Negative for chills and fever.   HENT: Negative for sore throat.    Eyes: Negative for visual disturbance.   Respiratory: Negative for cough and shortness of breath.    Cardiovascular: Negative for chest pain.   Gastrointestinal: Negative for abdominal pain, nausea and vomiting.   Genitourinary: Positive for dysuria, frequency, genital sores and urgency. Negative for discharge and hematuria.        Incomplete bladder emptying and increased nocturia   Musculoskeletal: Negative for back pain.   Skin:        Boil lesions and surrounding redness in R forearm and groin   Neurological: Negative for syncope.   Hematological: Does not bruise/bleed easily.   Psychiatric/Behavioral: Negative for confusion.     Objective:     Vital Signs (Most Recent):  Temp: 98.6 °F (37 °C) (04/05/18 1635)  Pulse: (!) 55 (04/05/18 1635)  Resp: 20 (04/05/18 1635)  BP: 115/60 (04/05/18 1635)  SpO2: 97 % (04/05/18 1635) Vital Signs (24h Range):  Temp:  [97.9 °F (36.6 °C)-98.6 °F (37 °C)] 98.6 °F (37 °C)  Pulse:  [55-66] 55  Resp:  [12-20] 20  SpO2:  [97 %-98 %] 97 %  BP: (112-144)/(60-76) 115/60     Weight: (S) 88.9 kg (196 lb) (taken in the ED QTRK, no scale  available)  Body mass index is 26.58 kg/m².    Physical Exam   Constitutional: He is oriented to person, place, and time. He appears well-developed and well-nourished. No distress.   HENT:   Head: Normocephalic and atraumatic.   Eyes: EOM are normal. Pupils are equal, round, and reactive to light.   Neck: Normal range of motion. Neck supple.   Cardiovascular: Normal rate, regular rhythm, normal heart sounds and intact distal pulses.    Pulmonary/Chest: Effort normal and breath sounds normal. No respiratory distress.   Abdominal: Soft. Bowel sounds are normal.   Musculoskeletal: Normal range of motion.   Neurological: He is alert and oriented to person, place, and time.   Skin: He is not diaphoretic.   Right forearm with I&D with dressing in place and with surrounding erythema. Groin area just superior to penis with erythema and induration, with extension to left mons pubis, no fluctuance noted   Psychiatric: He has a normal mood and affect. His behavior is normal. Thought content normal.         CRANIAL NERVES     CN III, IV, VI   Pupils are equal, round, and reactive to light.  Extraocular motions are normal.        Significant Labs: All pertinent labs within the past 24 hours have been reviewed.    Significant Imaging: I have reviewed and interpreted all pertinent imaging results/findings within the past 24 hours.

## 2018-04-05 NOTE — HPI
59yo M presented to ER with abscess on R arm and infection in groin area. He reports h/o recurrent abscess formation. R forearm abscess noted x 1 week with drainage with manipulation prior to admission. L groin swelling noted about 4 days ago. No drainage from groin area. 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 Denies fevers.     He reports significant LUTS that he has not sought care. Nocturia x 3-4 with weak stream. No known fam hx of PCa. No prior PCa screening.     CT scan - no obvious fluid collection in L groin to midline around penis, edema noted in soft tissues. No air in area of infection. Bladder moderately distended, no hydronephrosis. Large prostate.

## 2018-04-05 NOTE — PLAN OF CARE
Patient from home with his significant other and children.  Patient was independent prior to admit.  Patient does nothave a PCP or insurance.  Preferred pharmacy is [facility] on [address]  M[facility]NICA provided patient with a list of community clinics in [facility].  Patient's significant other will help if needed.        04/05/18 1535   Discharge Assessment   Assessment Type Discharge Planning Assessment   Confirmed/corrected address and phone number on facesheet? Yes   Assessment information obtained from? Patient   Prior to hospitilization cognitive status: Alert/Oriented   Prior to hospitalization functional status: Independent   Current cognitive status: Alert/Oriented   Current Functional Status: Independent   Facility Arrived From: home   Lives With significant other   Able to Return to Prior Arrangements yes   Is patient able to care for self after discharge? Yes   Patient's perception of discharge disposition home or selfcare   Readmission Within The Last 30 Days no previous admission in last 30 days   Patient currently being followed by outpatient case management? No   Patient currently receives any other outside agency services? No   Equipment Currently Used at Home none   Do you have any problems affording any of your prescribed medications? TBD  (No insurance.)   Does the patient receive services at the [facility]? No   Discharge Plan A Home   Discharge Plan B Home   Patient/Family In Agreement With Plan yes  (Pt from home with significant other and independent. No primary care physician.  Provided with  community clinic resource in [facility].)   Donna Jackson LMSW, [place], [facility]  4/5/2018

## 2018-04-05 NOTE — TELEPHONE ENCOUNTER
----- Message from Brenda Rose sent at 4/5/2018 11:56 AM CDT -----  Contact: Arlen Gutierrez in [employer] can be reached at [phone]    Abscess on penis

## 2018-04-05 NOTE — ASSESSMENT & PLAN NOTE
- Significant LUTS   - Started Flomax now   - Check PVR   - PSA   - May need TURP in future if limited improvement with pharmacotherapy

## 2018-04-05 NOTE — CONSULTS
Pt seen and examined. Unable to update assessment and plan on Epic at this time (another provider using). Full note to follow. Plan discussed with his RN.

## 2018-04-05 NOTE — ED TRIAGE NOTES
Pt with c/o an abscess to right forearm and to low abdomen area that began 1 1/2 weeks ago that is now worse.  Pt with redness, swelling, tenderness to area right forearm.

## 2018-04-05 NOTE — HPI
"59 y/o male with HTN, Hep C, +TOB and h/o abscess to R thigh/knee area with Staph infection prior to 2005 (pre-Leonor, unknown if it was MRSA vs MSSA) who presented with c/o right forearm "boil" that began 1 week ago and also with another lesion to his groin that he noticed 4 days ago.  Patient reported he intially thought he had a spider bite on R forearm, but it continued to increase in size. He squeezed it and it drained pus and blood continuously and again started to increase in size and redness surrounding the boil. He denies F/C and no reported trauma or injury to that site. Wife and son reported similar boils which got better for them this week as well. In the ER, an I&D was performed on the R forearm abscess and packing was placed, wound and blood cultures were sent off and he was given a dose of Vancomycin.  "

## 2018-04-06 ENCOUNTER — ANESTHESIA (OUTPATIENT)
Dept: SURGERY | Facility: HOSPITAL | Age: 58
DRG: 580 | End: 2018-04-06

## 2018-04-06 ENCOUNTER — SURGERY (OUTPATIENT)
Age: 58
End: 2018-04-06

## 2018-04-06 ENCOUNTER — ANESTHESIA EVENT (OUTPATIENT)
Dept: SURGERY | Facility: HOSPITAL | Age: 58
DRG: 580 | End: 2018-04-06

## 2018-04-06 LAB
ALBUMIN SERPL BCP-MCNC: 3.3 G/DL
ALP SERPL-CCNC: 93 U/L
ALT SERPL W/O P-5'-P-CCNC: 40 U/L
ANION GAP SERPL CALC-SCNC: 8 MMOL/L
AST SERPL-CCNC: 33 U/L
BILIRUB SERPL-MCNC: 0.6 MG/DL
BUN SERPL-MCNC: 9 MG/DL
CALCIUM SERPL-MCNC: 8.8 MG/DL
CHLORIDE SERPL-SCNC: 103 MMOL/L
CO2 SERPL-SCNC: 26 MMOL/L
COMPLEXED PSA SERPL-MCNC: 0.4 NG/ML
CREAT SERPL-MCNC: 0.8 MG/DL
EST. GFR  (AFRICAN AMERICAN): >60 ML/MIN/1.73 M^2
EST. GFR  (NON AFRICAN AMERICAN): >60 ML/MIN/1.73 M^2
ESTIMATED AVG GLUCOSE: 105 MG/DL
GLUCOSE SERPL-MCNC: 95 MG/DL
HBA1C MFR BLD HPLC: 5.3 %
POTASSIUM SERPL-SCNC: 3.9 MMOL/L
PROT SERPL-MCNC: 6.6 G/DL
SODIUM SERPL-SCNC: 137 MMOL/L
VANCOMYCIN TROUGH SERPL-MCNC: 7.2 UG/ML

## 2018-04-06 PROCEDURE — 87070 CULTURE OTHR SPECIMN AEROBIC: CPT

## 2018-04-06 PROCEDURE — 87075 CULTR BACTERIA EXCEPT BLOOD: CPT | Mod: 59

## 2018-04-06 PROCEDURE — 27200651 HC AIRWAY, LMA: Performed by: NURSE ANESTHETIST, CERTIFIED REGISTERED

## 2018-04-06 PROCEDURE — 80053 COMPREHEN METABOLIC PANEL: CPT

## 2018-04-06 PROCEDURE — 63600175 PHARM REV CODE 636 W HCPCS: Performed by: HOSPITALIST

## 2018-04-06 PROCEDURE — 25000003 PHARM REV CODE 250: Performed by: UROLOGY

## 2018-04-06 PROCEDURE — 37000009 HC ANESTHESIA EA ADD 15 MINS: Performed by: UROLOGY

## 2018-04-06 PROCEDURE — 37000008 HC ANESTHESIA 1ST 15 MINUTES: Performed by: UROLOGY

## 2018-04-06 PROCEDURE — 63600175 PHARM REV CODE 636 W HCPCS: Performed by: NURSE ANESTHETIST, CERTIFIED REGISTERED

## 2018-04-06 PROCEDURE — 11000001 HC ACUTE MED/SURG PRIVATE ROOM

## 2018-04-06 PROCEDURE — 80202 ASSAY OF VANCOMYCIN: CPT

## 2018-04-06 PROCEDURE — S4991 NICOTINE PATCH NONLEGEND: HCPCS | Performed by: HOSPITALIST

## 2018-04-06 PROCEDURE — 25000003 PHARM REV CODE 250: Performed by: HOSPITALIST

## 2018-04-06 PROCEDURE — 0J9C0ZZ DRAINAGE OF PELVIC REGION SUBCUTANEOUS TISSUE AND FASCIA, OPEN APPROACH: ICD-10-PCS | Performed by: UROLOGY

## 2018-04-06 PROCEDURE — 87070 CULTURE OTHR SPECIMN AEROBIC: CPT | Mod: 59

## 2018-04-06 PROCEDURE — 99232 SBSQ HOSP IP/OBS MODERATE 35: CPT | Mod: 25,,, | Performed by: UROLOGY

## 2018-04-06 PROCEDURE — D9220A PRA ANESTHESIA: Mod: ,,, | Performed by: ANESTHESIOLOGY

## 2018-04-06 PROCEDURE — 36000704 HC OR TIME LEV I 1ST 15 MIN: Performed by: UROLOGY

## 2018-04-06 PROCEDURE — 25000003 PHARM REV CODE 250: Performed by: ANESTHESIOLOGY

## 2018-04-06 PROCEDURE — 36000705 HC OR TIME LEV I EA ADD 15 MIN: Performed by: UROLOGY

## 2018-04-06 PROCEDURE — 87077 CULTURE AEROBIC IDENTIFY: CPT | Mod: 59

## 2018-04-06 PROCEDURE — 84153 ASSAY OF PSA TOTAL: CPT

## 2018-04-06 PROCEDURE — 10060 I&D ABSCESS SIMPLE/SINGLE: CPT | Mod: ,,, | Performed by: UROLOGY

## 2018-04-06 PROCEDURE — S0020 INJECTION, BUPIVICAINE HYDRO: HCPCS | Performed by: UROLOGY

## 2018-04-06 PROCEDURE — 71000033 HC RECOVERY, INTIAL HOUR: Performed by: UROLOGY

## 2018-04-06 PROCEDURE — 87075 CULTR BACTERIA EXCEPT BLOOD: CPT

## 2018-04-06 PROCEDURE — 36415 COLL VENOUS BLD VENIPUNCTURE: CPT

## 2018-04-06 PROCEDURE — 87186 SC STD MICRODIL/AGAR DIL: CPT | Mod: 59

## 2018-04-06 PROCEDURE — 25000003 PHARM REV CODE 250: Performed by: NURSE ANESTHETIST, CERTIFIED REGISTERED

## 2018-04-06 PROCEDURE — 94761 N-INVAS EAR/PLS OXIMETRY MLT: CPT

## 2018-04-06 RX ORDER — LIDOCAINE HCL/PF 100 MG/5ML
SYRINGE (ML) INTRAVENOUS
Status: DISCONTINUED | OUTPATIENT
Start: 2018-04-06 | End: 2018-04-06

## 2018-04-06 RX ORDER — MIDAZOLAM HYDROCHLORIDE 1 MG/ML
INJECTION, SOLUTION INTRAMUSCULAR; INTRAVENOUS
Status: DISCONTINUED | OUTPATIENT
Start: 2018-04-06 | End: 2018-04-06

## 2018-04-06 RX ORDER — TRAMADOL HYDROCHLORIDE 50 MG/1
50 TABLET ORAL EVERY 6 HOURS PRN
Status: DISCONTINUED | OUTPATIENT
Start: 2018-04-06 | End: 2018-04-08 | Stop reason: HOSPADM

## 2018-04-06 RX ORDER — OXYCODONE AND ACETAMINOPHEN 5; 325 MG/1; MG/1
1 TABLET ORAL
Status: DISCONTINUED | OUTPATIENT
Start: 2018-04-06 | End: 2018-04-07

## 2018-04-06 RX ORDER — FENTANYL CITRATE 50 UG/ML
INJECTION, SOLUTION INTRAMUSCULAR; INTRAVENOUS
Status: DISCONTINUED | OUTPATIENT
Start: 2018-04-06 | End: 2018-04-06

## 2018-04-06 RX ORDER — METOCLOPRAMIDE HYDROCHLORIDE 5 MG/ML
INJECTION INTRAMUSCULAR; INTRAVENOUS
Status: DISCONTINUED | OUTPATIENT
Start: 2018-04-06 | End: 2018-04-06

## 2018-04-06 RX ORDER — METOCLOPRAMIDE HYDROCHLORIDE 5 MG/ML
10 INJECTION INTRAMUSCULAR; INTRAVENOUS EVERY 10 MIN PRN
Status: DISCONTINUED | OUTPATIENT
Start: 2018-04-06 | End: 2018-04-07

## 2018-04-06 RX ORDER — SODIUM CHLORIDE 0.9 % (FLUSH) 0.9 %
3 SYRINGE (ML) INJECTION
Status: DISCONTINUED | OUTPATIENT
Start: 2018-04-06 | End: 2018-04-08 | Stop reason: HOSPADM

## 2018-04-06 RX ORDER — MEPERIDINE HYDROCHLORIDE 50 MG/ML
12.5 INJECTION INTRAMUSCULAR; INTRAVENOUS; SUBCUTANEOUS ONCE AS NEEDED
Status: ACTIVE | OUTPATIENT
Start: 2018-04-06 | End: 2018-04-06

## 2018-04-06 RX ORDER — HYDROMORPHONE HYDROCHLORIDE 2 MG/ML
0.2 INJECTION, SOLUTION INTRAMUSCULAR; INTRAVENOUS; SUBCUTANEOUS EVERY 5 MIN PRN
Status: DISCONTINUED | OUTPATIENT
Start: 2018-04-06 | End: 2018-04-07

## 2018-04-06 RX ORDER — MORPHINE SULFATE 10 MG/ML
2 INJECTION INTRAMUSCULAR; INTRAVENOUS; SUBCUTANEOUS EVERY 5 MIN PRN
Status: DISCONTINUED | OUTPATIENT
Start: 2018-04-06 | End: 2018-04-07

## 2018-04-06 RX ORDER — GLYCOPYRROLATE 0.2 MG/ML
INJECTION INTRAMUSCULAR; INTRAVENOUS
Status: DISCONTINUED | OUTPATIENT
Start: 2018-04-06 | End: 2018-04-06

## 2018-04-06 RX ORDER — PROPOFOL 10 MG/ML
VIAL (ML) INTRAVENOUS
Status: DISCONTINUED | OUTPATIENT
Start: 2018-04-06 | End: 2018-04-06

## 2018-04-06 RX ORDER — SODIUM CHLORIDE, SODIUM LACTATE, POTASSIUM CHLORIDE, CALCIUM CHLORIDE 600; 310; 30; 20 MG/100ML; MG/100ML; MG/100ML; MG/100ML
INJECTION, SOLUTION INTRAVENOUS CONTINUOUS PRN
Status: DISCONTINUED | OUTPATIENT
Start: 2018-04-06 | End: 2018-04-06

## 2018-04-06 RX ORDER — BUPIVACAINE HYDROCHLORIDE 2.5 MG/ML
INJECTION, SOLUTION INFILTRATION; PERINEURAL
Status: DISCONTINUED | OUTPATIENT
Start: 2018-04-06 | End: 2018-04-06 | Stop reason: HOSPADM

## 2018-04-06 RX ADMIN — NICOTINE 1 PATCH: 21 PATCH, EXTENDED RELEASE TRANSDERMAL at 10:04

## 2018-04-06 RX ADMIN — VANCOMYCIN HYDROCHLORIDE 1250 MG: 1 INJECTION, POWDER, LYOPHILIZED, FOR SOLUTION INTRAVENOUS at 11:04

## 2018-04-06 RX ADMIN — TAMSULOSIN HYDROCHLORIDE 0.4 MG: 0.4 CAPSULE ORAL at 12:04

## 2018-04-06 RX ADMIN — GLYCOPYRROLATE 0.2 MG: 0.2 INJECTION, SOLUTION INTRAMUSCULAR; INTRAVENOUS at 10:04

## 2018-04-06 RX ADMIN — FENTANYL CITRATE 50 MCG: 50 INJECTION INTRAMUSCULAR; INTRAVENOUS at 10:04

## 2018-04-06 RX ADMIN — SODIUM CHLORIDE, SODIUM LACTATE, POTASSIUM CHLORIDE, AND CALCIUM CHLORIDE: .6; .31; .03; .02 INJECTION, SOLUTION INTRAVENOUS at 10:04

## 2018-04-06 RX ADMIN — PROPOFOL 160 MG: 10 INJECTION, EMULSION INTRAVENOUS at 10:04

## 2018-04-06 RX ADMIN — METOCLOPRAMIDE 10 MG: 5 INJECTION, SOLUTION INTRAMUSCULAR; INTRAVENOUS at 10:04

## 2018-04-06 RX ADMIN — DOCUSATE SODIUM AND SENNOSIDES 1 TABLET: 8.6; 5 TABLET, FILM COATED ORAL at 10:04

## 2018-04-06 RX ADMIN — OXYCODONE HYDROCHLORIDE AND ACETAMINOPHEN 1 TABLET: 5; 325 TABLET ORAL at 03:04

## 2018-04-06 RX ADMIN — KETOROLAC TROMETHAMINE 15 MG: 30 INJECTION, SOLUTION INTRAMUSCULAR; INTRAVENOUS at 07:04

## 2018-04-06 RX ADMIN — BUPIVACAINE HYDROCHLORIDE 50 ML: 2.5 INJECTION, SOLUTION INFILTRATION; PERINEURAL at 11:04

## 2018-04-06 RX ADMIN — KETOROLAC TROMETHAMINE 15 MG: 30 INJECTION, SOLUTION INTRAMUSCULAR; INTRAVENOUS at 10:04

## 2018-04-06 RX ADMIN — FENTANYL CITRATE 50 MCG: 50 INJECTION INTRAMUSCULAR; INTRAVENOUS at 11:04

## 2018-04-06 RX ADMIN — MIDAZOLAM HYDROCHLORIDE 2 MG: 1 INJECTION, SOLUTION INTRAMUSCULAR; INTRAVENOUS at 10:04

## 2018-04-06 RX ADMIN — LIDOCAINE HYDROCHLORIDE 100 MG: 20 INJECTION, SOLUTION INTRAVENOUS at 10:04

## 2018-04-06 NOTE — ASSESSMENT & PLAN NOTE
As discussed above  Outpatient GI referral for curative treatment (discussed with the patient and family)

## 2018-04-06 NOTE — PROGRESS NOTES
[facility]  Urology  Progress Note    Patient Name: Jose Morales  MRN: [id number]  Admission Date: 4/5/2018  Hospital Length of Stay: 1 days  Code Status: Full Code   Attending Provider: Kamila Burton MD   Primary Care Physician: Primary Doctor No    Subjective:     HPI:  59yo M presented to ER with abscess on R arm and infection in groin area. He reports h/o recurrent abscess formation. R forearm abscess noted x 1 week with drainage with manipulation prior to admission. L groin swelling noted about 4 days ago. No drainage from groin area. 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 Denies fevers.     He reports significant LUTS that he has not sought care. Nocturia x 3-4 with weak stream. No known fam hx of PCa. No prior PCa screening.     CT scan - no obvious fluid collection in L groin to midline around penis, edema noted in soft tissues. No air in area of infection. Bladder moderately distended, no hydronephrosis. Large prostate.     Interval History: Continues with groin pain.    Review of Systems   Constitutional: Negative.    HENT: Negative.    Eyes: Negative.    Respiratory: Negative for cough, chest tightness and shortness of breath.    Cardiovascular: Negative for chest pain.   Gastrointestinal: Negative.  Negative for constipation, diarrhea and nausea.   Genitourinary: Positive for genital sores.   Musculoskeletal: Negative.    Neurological: Negative.    Psychiatric/Behavioral: Negative.      Objective:     Temp:  [97.6 °F (36.4 °C)-98.6 °F (37 °C)] 97.7 °F (36.5 °C)  Pulse:  [55-71] 62  Resp:  [12-20] 18  SpO2:  [95 %-98 %] 97 %  BP: (111-149)/(60-76) 111/64     Body mass index is 25.89 kg/m².       Bladder Scan Volume (mL): 494 mL (post void) (04/05/18 2000)    Drains          No matching active lines, drains, or airways          Physical Exam   Nursing note and vitals reviewed.  Constitutional: He is oriented to person, place, and time. He appears well-developed and  "well-nourished.   HENT:   Head: Normocephalic.   Eyes: Conjunctivae are normal.   Neck: Normal range of motion. Neck supple. No tracheal deviation present. No thyromegaly present.   Cardiovascular: Normal rate and normal heart sounds.    Pulmonary/Chest: Effort normal and breath sounds normal. No respiratory distress. He has no wheezes.   Abdominal: Soft. Bowel sounds are normal. There is no hepatosplenomegaly. There is no tenderness. There is no rebound and no CVA tenderness. No hernia.   Genitourinary: Testes normal and penis normal.         Genitourinary Comments: Beginnings of "pointing" fluctuance palpated.   Musculoskeletal: Normal range of motion. He exhibits no edema or tenderness.   Lymphadenopathy:     He has no cervical adenopathy.   Neurological: He is alert and oriented to person, place, and time.   Skin: Skin is warm and dry. No rash noted. No erythema.     Psychiatric: He has a normal mood and affect. His behavior is normal. Judgment and thought content normal.       Significant Labs:    BMP:    Recent Labs  Lab 04/05/18  1000 04/06/18  0449    137   K 4.4 3.9    103   CO2 30* 26   BUN 9 9   CREATININE 0.8 0.8   CALCIUM 9.7 8.8       CBC:     Recent Labs  Lab 04/05/18  1000   WBC 7.96   HGB 15.4   HCT 45.4          Blood Culture:   Recent Labs  Lab 04/05/18  1002 04/05/18  1018   LABBLOO No Growth to date No Growth to date     Urine Culture: No results for input(s): LABURIN in the last 168 hours.    Significant Imaging:  CT: I have reviewed all results within the past 24 hours and my personal findings are:  CT yesterday shows soft tissue stranding no abscess on CT 4/5                  Assessment/Plan:     Enlarged prostate     - Significant LUTS   - Started Flomax now   - Check PVR   - PSA   - May need TURP in future if limited improvement with pharmacotherapy        Bladder wall thickening     - Suspect 2/2 WEAVER due to enlarged prostate   - May be infectious. UCx ordered.   - Start " Flomax   - Will need PSA/NICO. PSA in AM - may be falsely elevated if UTI present        Cellulitis of groin    Seems to have evolved into a small abscess, plan for I/D today  Consented  Keep NPO  Continue antibiotics            VTE Risk Mitigation         Ordered     IP VTE LOW RISK PATIENT  Once      04/05/18 1647     Place SAARH hose  Until discontinued      04/05/18 1647     Place sequential compression device  Until discontinued      04/05/18 1647          SHAY Becerra MD  Urology  [facility] [place]

## 2018-04-06 NOTE — SUBJECTIVE & OBJECTIVE
"Interval History: Continues with groin pain.    Review of Systems   Constitutional: Negative.    HENT: Negative.    Eyes: Negative.    Respiratory: Negative for cough, chest tightness and shortness of breath.    Cardiovascular: Negative for chest pain.   Gastrointestinal: Negative.  Negative for constipation, diarrhea and nausea.   Genitourinary: Positive for genital sores.   Musculoskeletal: Negative.    Neurological: Negative.    Psychiatric/Behavioral: Negative.      Objective:     Temp:  [97.6 °F (36.4 °C)-98.6 °F (37 °C)] 97.7 °F (36.5 °C)  Pulse:  [55-71] 62  Resp:  [12-20] 18  SpO2:  [95 %-98 %] 97 %  BP: (111-149)/(60-76) 111/64     Body mass index is 25.89 kg/m².       Bladder Scan Volume (mL): 494 mL (post void) (04/05/18 2000)    Drains          No matching active lines, drains, or airways          Physical Exam   Nursing note and vitals reviewed.  Constitutional: He is oriented to person, place, and time. He appears well-developed and well-nourished.   HENT:   Head: Normocephalic.   Eyes: Conjunctivae are normal.   Neck: Normal range of motion. Neck supple. No tracheal deviation present. No thyromegaly present.   Cardiovascular: Normal rate and normal heart sounds.    Pulmonary/Chest: Effort normal and breath sounds normal. No respiratory distress. He has no wheezes.   Abdominal: Soft. Bowel sounds are normal. There is no hepatosplenomegaly. There is no tenderness. There is no rebound and no CVA tenderness. No hernia.   Genitourinary: Testes normal and penis normal.         Genitourinary Comments: Beginnings of "pointing" fluctuance palpated.   Musculoskeletal: Normal range of motion. He exhibits no edema or tenderness.   Lymphadenopathy:     He has no cervical adenopathy.   Neurological: He is alert and oriented to person, place, and time.   Skin: Skin is warm and dry. No rash noted. No erythema.     Psychiatric: He has a normal mood and affect. His behavior is normal. Judgment and thought content " normal.       Significant Labs:    BMP:    Recent Labs  Lab 04/05/18  1000 04/06/18  0449    137   K 4.4 3.9    103   CO2 30* 26   BUN 9 9   CREATININE 0.8 0.8   CALCIUM 9.7 8.8       CBC:     Recent Labs  Lab 04/05/18  1000   WBC 7.96   HGB 15.4   HCT 45.4          Blood Culture:   Recent Labs  Lab 04/05/18  1002 04/05/18  1018   LABBLOO No Growth to date No Growth to date     Urine Culture: No results for input(s): LABURIN in the last 168 hours.    Significant Imaging:  CT: I have reviewed all results within the past 24 hours and my personal findings are:  CT yesterday shows soft tissue stranding no abscess on CT 4/5

## 2018-04-06 NOTE — PROGRESS NOTES
Follow-up Information     [facility] In 1 week.    Why:  Will need to schedule appointment with clinic for [place] location. They are closed on Fridays  Contact information:  [address] [address]  [phone]             [facility] [place].    Why:  This is the location in [place]  Contact information:  [address] [facility] [place] [address]  [phone]

## 2018-04-06 NOTE — ASSESSMENT & PLAN NOTE
Pt with abscess s/p I&D done in the ER with surrounding cellulitis  Does not meet criteria for sepsis  Treat empirically with Vancomycin  Will f/u on wound and blood cultures- blood cultures NGTD, wound cultures pending

## 2018-04-06 NOTE — ASSESSMENT & PLAN NOTE
Pt with progressive symptoms of dysuria with outlet obstruction  Likely symptomatic BPH  Urology already consulted  Will check PSA level- pending  Started tamulosin   Will need outpatient Urology follow upon discharge

## 2018-04-06 NOTE — PLAN OF CARE
Problem: Patient Care Overview  Goal: Plan of Care Review  Outcome: Ongoing (interventions implemented as appropriate)   04/05/18 1912   Coping/Psychosocial   Plan Of Care Reviewed With patient     Patient remained free from falls, trauma, and injuries throughout shift. Complaints of pain controlled with prn analgesics. No complaints of nausea or vomiting. Medications administered as prescribed. Edema and erythema noted to RUE and L groin.  Urology consulted. Educated on being NPO after midnight; patient and spouse verbalized understanding.  Spouse remains at bedside; very supportive in care. No acute distress noted.

## 2018-04-06 NOTE — ASSESSMENT & PLAN NOTE
Seems to have evolved into a small abscess, plan for I/D today  Consented  Keep NPO  Continue antibiotics

## 2018-04-06 NOTE — NURSING
Surgical consent signed, witnessed, and placed in chart. Surgical bath in process. Linen changed.

## 2018-04-06 NOTE — NURSING
Patient back on unit from surgery via stretcher accompanied by spouse. Potty, pain, and position needs addressed. Dressing to L groin clean, dry, and intact. No acute distress noted. Will continue to monitor.

## 2018-04-06 NOTE — BRIEF OP NOTE
[facility]  Brief Operative Note    SUMMARY     Surgery Date: 4/6/2018     Surgeon(s) and Role:     * Mellisa Mccullough MD - Primary    Assisting Surgeon: None    Pre-op Diagnosis:  Cellulitis of groin [L03.314]    Post-op Diagnosis:  Post-Op Diagnosis Codes:     * Cellulitis of groin [L03.314] / abscess of groin    Procedure(s) (LRB):  INCISION AND DRAINAGE (I & D) (Left groin)    Anesthesia: General    Description of Procedure: I&D of left groin/penile base - thick purulent material drained. Cultures taken.    Description of the findings of the procedure: Wound packed with 1in Iodoform gauze.    Estimated Blood Loss: <5cc         Specimens:   Specimen (12h ago through future)    None      Cultures      #[id number]

## 2018-04-06 NOTE — ASSESSMENT & PLAN NOTE
Noted on CT scan and with abnormal LFTS due to Hep C  Pt and family counseled on findings and educated on need to f/u with GI to treat Hep C as outpatient  Will follow LFTs- improved today

## 2018-04-06 NOTE — ASSESSMENT & PLAN NOTE
As discussed above'  Outpatient GI referral for curative treatment (discussed with the patient and family)

## 2018-04-06 NOTE — ANESTHESIA PREPROCEDURE EVALUATION
04/06/2018  Jose Morales is a 58 y.o., male.    Anesthesia Evaluation    I have reviewed the Patient Summary Reports.     I have reviewed the Medications.     Review of Systems  Anesthesia Hx:  No problems with previous Anesthesia    Social:  Smoker, Alcohol Use    Cardiovascular:   Exercise tolerance: good Hypertension    Renal/:   BPH    Hepatic/GI:   Liver Disease, Hepatitis        Physical Exam  General:  Well nourished    Airway/Jaw/Neck:  Airway Findings: Mouth Opening: Normal Tongue: Normal  General Airway Assessment: Adult  Mallampati: II  TM Distance: Normal, at least 6 cm  Jaw/Neck Findings:  Neck ROM: Normal ROM      Dental:  Dental Findings: In tact   Chest/Lungs:  Chest/Lungs Findings: Clear to auscultation, Normal Respiratory Rate     Heart/Vascular:  Heart Findings: Rate: Normal        Mental Status:  Mental Status Findings:  Cooperative, Alert and Oriented         Anesthesia Plan  Type of Anesthesia, risks & benefits discussed:  Anesthesia Type:  general  Patient's Preference:   Intra-op Monitoring Plan: standard ASA monitors  Intra-op Monitoring Plan Comments:   Post Op Pain Control Plan: multimodal analgesia, IV/PO Opioids PRN and per primary service following discharge from PACU  Post Op Pain Control Plan Comments:   Induction:   IV  Beta Blocker:  Patient is not currently on a Beta-Blocker (No further documentation required).       Informed Consent: Patient understands risks and agrees with Anesthesia plan.  Questions answered. Anesthesia consent signed with patient.  ASA Score: 3     Day of Surgery Review of History & Physical:    H&P update referred to the surgeon.         Ready For Surgery From Anesthesia Perspective.

## 2018-04-06 NOTE — H&P
"[facility] Medicine  History & Physical    Patient Name: Jose Morales  MRN: [id number]  Admission Date: 4/5/2018  Attending Physician: Kamila Burton MD   Primary Care Provider: Primary Doctor No         Patient information was obtained from patient, spouse/SO and ER records.     Subjective:     Principal Problem:Cellulitis of right arm    Chief Complaint:   Chief Complaint   Patient presents with    Abscess     right forearm and left groin x 1 week.        HPI: 59 y/o male with HTN, Hep C, +TOB and h/o abscess to R thigh/knee area with Staph infection prior to 2005 (pre-Leonor, unknown if it was MRSA vs MSSA) who presented with c/o right forearm "boil" that began 1 week ago and also with another lesion to his groin that he noticed 4 days ago.  Patient reported he intially thought he had a spider bite on R forearm, but it continued to increase in size. He squeezed it and it drained pus and blood continuously and again started to increase in size and redness surrounding the boil. He denies F/C and no reported trauma or injury to that site. Wife and son reported similar boils which got better for them this week as well. In the ER, an I&D was performed on the R forearm abscess and packing was placed, wound and blood cultures were sent off and he was given a dose of Vancomycin.    Past Medical History:   Diagnosis Date    Abscess     Rt forearm    Cigarette smoker one half pack a day or less     Hypertension        Past Surgical History:   Procedure Laterality Date    ABCESS DRAINAGE Right 04/05/2018    forearm       Review of patient's allergies indicates:  No Known Allergies    No current facility-administered medications on file prior to encounter.      No current outpatient prescriptions on file prior to encounter.     Family History     None        Social History Main Topics    Smoking status: Current Every Day Smoker     Packs/day: 0.50     Types: Cigarettes    Smokeless " tobacco: Never Used    Alcohol use Yes    Drug use: No    Sexual activity: Yes     Partners: Female     Birth control/ protection: None     Review of Systems   Constitutional: Negative for chills and fever.   HENT: Negative for sore throat.    Eyes: Negative for visual disturbance.   Respiratory: Negative for cough and shortness of breath.    Cardiovascular: Negative for chest pain.   Gastrointestinal: Negative for abdominal pain, nausea and vomiting.   Genitourinary: Positive for dysuria, frequency, genital sores and urgency. Negative for discharge and hematuria.        Incomplete bladder emptying and increased nocturia   Musculoskeletal: Negative for back pain.   Skin:        Boil lesions and surrounding redness in R forearm and groin   Neurological: Negative for syncope.   Hematological: Does not bruise/bleed easily.   Psychiatric/Behavioral: Negative for confusion.     Objective:     Vital Signs (Most Recent):  Temp: 98.6 °F (37 °C) (04/05/18 1635)  Pulse: (!) 55 (04/05/18 1635)  Resp: 20 (04/05/18 1635)  BP: 115/60 (04/05/18 1635)  SpO2: 97 % (04/05/18 1635) Vital Signs (24h Range):  Temp:  [97.9 °F (36.6 °C)-98.6 °F (37 °C)] 98.6 °F (37 °C)  Pulse:  [55-66] 55  Resp:  [12-20] 20  SpO2:  [97 %-98 %] 97 %  BP: (112-144)/(60-76) 115/60     Weight: (S) 88.9 kg (196 lb) (taken in the ED QTRK, no scale available)  Body mass index is 26.58 kg/m².    Physical Exam   Constitutional: He is oriented to person, place, and time. He appears well-developed and well-nourished. No distress.   HENT:   Head: Normocephalic and atraumatic.   Eyes: EOM are normal. Pupils are equal, round, and reactive to light.   Neck: Normal range of motion. Neck supple.   Cardiovascular: Normal rate, regular rhythm, normal heart sounds and intact distal pulses.    Pulmonary/Chest: Effort normal and breath sounds normal. No respiratory distress.   Abdominal: Soft. Bowel sounds are normal.   Musculoskeletal: Normal range of motion.    Neurological: He is alert and oriented to person, place, and time.   Skin: He is not diaphoretic.   Right forearm with I&D with dressing in place and with surrounding erythema. Groin area just superior to penis with erythema and induration, with extension to left mons pubis, no fluctuance noted   Psychiatric: He has a normal mood and affect. His behavior is normal. Thought content normal.         CRANIAL NERVES     CN III, IV, VI   Pupils are equal, round, and reactive to light.  Extraocular motions are normal.        Significant Labs: All pertinent labs within the past 24 hours have been reviewed.    Significant Imaging: I have reviewed and interpreted all pertinent imaging results/findings within the past 24 hours.    Assessment/Plan:     * Cellulitis of right arm    Pt with abscess s/p I&D done in the ER with surrounding cellulitis  Does not meet criteria for sepsis  Treat empirically with Vancomycin  Will f/u on wound and blood cultures        Abscess of right forearm    As discussed above        Cellulitis of groin    Imaging with CT not consistent with abscess, but just cellulitis  Treatment with Vancomycin as discussed above  Urology has been consulted and will follow and monitor to determine if abscess will form        Hepatic steatosis    Noted on CT scan and with abnormal LFTS due to Hep C  Pt and family counseled on findings and educated on need to f/u with GI to treat Hep C as outpatient  Will follow LFTs        Chronic hepatitis C virus infection    As discussed above'  Outpatient GI referral for curative treatment (discussed with the patient and family)        Bladder wall thickening    Pt with progressive symptoms of dysuria with outlet obstruction  Likely symptomatic BPH  Urology already consulted  Will check PSA level and tamulosin started  Will need outpatient Urology follow upon discharge        Enlarged prostate    As discussed above        Essential hypertension    Continue home medication  regimen.  PRN hydralazine        Tobacco abuse    Smoking cessation counseling done  Nicotine patch while inpatient          VTE Risk Mitigation         Ordered     IP VTE LOW RISK PATIENT  Once      04/05/18 1647     Place SARAH hose  Until discontinued      04/05/18 1647     Place sequential compression device  Until discontinued      04/05/18 1647             Gerda Torres MD  Department of Hospital Medicine   [facility]

## 2018-04-06 NOTE — PROGRESS NOTES
WRITTEN DISCHARGE INFORMATION:     Follow-up Information     [facility] In 1 week.    Why:  Will need to schedule appointment with clinic for [place] location. They are closed on Fridays  Contact information:  [address] [address]  [phone]             [facility] [place].    Why:  This is the location in [place]  Contact information:  [address]  [place] [address]  [phone]             Mellisa Mccullough MD On 4/13/2018.    Specialty:  Urology  Why:  WILL SEE NURSE PRACTITIONER  Contact information:  [address]  [facility] [place] [facility] [address]  [phone]             Cassie Marti NP On 4/13/2018.    Specialty:  Urology  Why:  out patient services:10:30 AM follow up from the hospital.  Had I&D  Contact information:  [address]  [place] [place] [address]  [phone]               Things that YOU are responsible for to Manage Your Care At Home:  1. Getting your prescriptions filled.  2. Taking you medications as directed. DO NOT MISS ANY DOSES!  3. Going to your follow-up doctor appointments. This is important because it allows the doctor to monitor your progress and to determine if any changes need to be made to your treatment plan.                                                        Help at Home  After discharge for assistance [facility] On [facility] Care Line 24/7 assistance  [phone]   Thank you for choosing [facility] for your care.  Please answer any calls you may receive from [facility]. Your [facility] Healthcare Manager is,  Adele Lyons RN [facility] [phone]

## 2018-04-06 NOTE — TRANSFER OF CARE
Anesthesia Transfer of Care Note    Patient: [place] Encalade    Procedure(s) Performed: Procedure(s) (LRB):  INCISION AND DRAINAGE (I & D) (Left)    Patient location: PACU    Anesthesia Type: general    Transport from OR: Transported from OR on room air with adequate spontaneous ventilation    Post pain: adequate analgesia    Post assessment: no apparent anesthetic complications    Post vital signs: stable    Level of consciousness: awake, alert and oriented    Nausea/Vomiting: no nausea/vomiting    Complications: none    Transfer of care protocol was followed      Last vitals:   Visit Vitals  /70 (BP Location: Right arm, Patient Position: Lying)   Pulse 90   Temp 36.5 °C (97.7 °F) (Oral)   Resp 12   Ht 6' (1.829 m)   Wt 86.6 kg (190 lb 14.4 oz)   SpO2 100%   BMI 25.89 kg/m²

## 2018-04-06 NOTE — NURSING
Patient arrived to unit from ED via wheelchair, accompanied by staff and family.  Patient oriented to room and call light system. Bed in low position, wheels locked, alarms on and audible.  Potty, position, and pain needs addressed.  No acute distress noted;  Will continue to monitor.

## 2018-04-06 NOTE — PROGRESS NOTES
"[facility] Medicine  Progress Note    Patient Name: Jose Morales  MRN: [id number]  Patient Class: IP- Inpatient   Admission Date: 4/5/2018  Length of Stay: 1 days  Attending Physician: Kamila Burton MD  Primary Care Provider: Primary Doctor No        Subjective:     Principal Problem:Cellulitis of right arm    HPI:  59 y/o male with HTN, Hep C, +TOB and h/o abscess to R thigh/knee area with Staph infection prior to 2005 (pre-Leonor, unknown if it was MRSA vs MSSA) who presented with c/o right forearm "boil" that began 1 week ago and also with another lesion to his groin that he noticed 4 days ago.  Patient reported he intially thought he had a spider bite on R forearm, but it continued to increase in size. He squeezed it and it drained pus and blood continuously and again started to increase in size and redness surrounding the boil. He denies F/C and no reported trauma or injury to that site. Wife and son reported similar boils which got better for them this week as well. In the ER, an I&D was performed on the R forearm abscess and packing was placed, wound and blood cultures were sent off and he was given a dose of Vancomycin.    Hospital Course:  Admitted for R forearm abscess and groin cellulitis. R forearm abscess I&D performed in ED on 4/5. Started on vancomycin. Urology following for groin cellulitis. HIV negative, A1c wnl.     Interval History: No complaints this Am.     Review of Systems   Constitutional: Negative for chills and fever.   Respiratory: Negative for cough, chest tightness and shortness of breath.    Cardiovascular: Negative for chest pain.   Gastrointestinal: Negative for abdominal pain, constipation, diarrhea, nausea and vomiting.   Genitourinary: Negative for decreased urine volume and difficulty urinating.   Skin: Positive for rash and wound.     Objective:     Vital Signs (Most Recent):  Temp: 97.7 °F (36.5 °C) (04/06/18 0735)  Pulse: 62 (04/06/18 " 0735)  Resp: 18 (04/06/18 0735)  BP: 111/64 (04/06/18 0735)  SpO2: 97 % (04/06/18 0735) Vital Signs (24h Range):  Temp:  [97.6 °F (36.4 °C)-98.6 °F (37 °C)] 97.7 °F (36.5 °C)  Pulse:  [55-71] 62  Resp:  [12-20] 18  SpO2:  [95 %-98 %] 97 %  BP: (111-149)/(60-76) 111/64     Weight: 86.6 kg (190 lb 14.4 oz)  Body mass index is 25.89 kg/m².    Intake/Output Summary (Last 24 hours) at 04/06/18 0828  Last data filed at 04/06/18 0551   Gross per 24 hour   Intake              480 ml   Output             1125 ml   Net             -645 ml      Physical Exam   Constitutional: He is oriented to person, place, and time. He appears well-developed. No distress.   HENT:   Head: Normocephalic and atraumatic.   Nose: Nose normal.   Mouth/Throat: Oropharynx is clear and moist. No oropharyngeal exudate.   Eyes: Conjunctivae and EOM are normal. No scleral icterus.   Cardiovascular: Normal rate, regular rhythm, normal heart sounds and intact distal pulses.  Exam reveals no gallop and no friction rub.    No murmur heard.  Pulmonary/Chest: Effort normal and breath sounds normal. No respiratory distress. He has no wheezes. He has no rales.   Abdominal: Soft. Bowel sounds are normal. He exhibits no distension and no mass. There is no tenderness. There is no guarding.   Musculoskeletal: He exhibits no edema, tenderness or deformity.   Neurological: He is alert and oriented to person, place, and time.   Skin: He is not diaphoretic.   R forearm abscess dressing removed, packing with some blood on it, no signs of surrounding cellulitis. Base of penis with induration and erythema, hair shaved, no fluctuance.        Significant Labs: All pertinent labs within the past 24 hours have been reviewed.    Significant Imaging: I have reviewed and interpreted all pertinent imaging results/findings within the past 24 hours.    Assessment/Plan:      * Cellulitis of right arm    Pt with abscess s/p I&D done in the ER with surrounding cellulitis  Does not  meet criteria for sepsis  Treat empirically with Vancomycin  Will f/u on wound and blood cultures- blood cultures NGTD, wound cultures pending         Chronic hepatitis C virus infection    As discussed above  Outpatient GI referral for curative treatment (discussed with the patient and family)        Enlarged prostate    As discussed above        Tobacco abuse    Smoking cessation counseling done  Nicotine patch while inpatient        Abscess of right forearm    As discussed above  Wound care consulted        Essential hypertension    Not currently on any medications, though patient states that he is on Rx as outpatient.   BP currently well controlled  Continue to monitor  PRN hydralazine        Bladder wall thickening    Pt with progressive symptoms of dysuria with outlet obstruction  Likely symptomatic BPH  Urology already consulted  Will check PSA level- pending  Started tamulosin   Will need outpatient Urology follow upon discharge        Hepatic steatosis    Noted on CT scan and with abnormal LFTS due to Hep C  Pt and family counseled on findings and educated on need to f/u with GI to treat Hep C as outpatient  Will follow LFTs- improved today        Cellulitis of groin    Imaging with CT not consistent with abscess, but just cellulitis  Treatment with Vancomycin as discussed above  Urology has been consulted and will follow and monitor to determine if abscess will form          VTE Risk Mitigation         Ordered     IP VTE LOW RISK PATIENT  Once      04/05/18 1647     Place SARAH hose  Until discontinued      04/05/18 1647     Place sequential compression device  Until discontinued      04/05/18 1647              Kamila Burton MD  Department of Hospital Medicine   [facility]

## 2018-04-06 NOTE — SUBJECTIVE & OBJECTIVE
Interval History: No complaints this Am.     Review of Systems   Constitutional: Negative for chills and fever.   Respiratory: Negative for cough, chest tightness and shortness of breath.    Cardiovascular: Negative for chest pain.   Gastrointestinal: Negative for abdominal pain, constipation, diarrhea, nausea and vomiting.   Genitourinary: Negative for decreased urine volume and difficulty urinating.   Skin: Positive for rash and wound.     Objective:     Vital Signs (Most Recent):  Temp: 97.7 °F (36.5 °C) (04/06/18 0735)  Pulse: 62 (04/06/18 0735)  Resp: 18 (04/06/18 0735)  BP: 111/64 (04/06/18 0735)  SpO2: 97 % (04/06/18 0735) Vital Signs (24h Range):  Temp:  [97.6 °F (36.4 °C)-98.6 °F (37 °C)] 97.7 °F (36.5 °C)  Pulse:  [55-71] 62  Resp:  [12-20] 18  SpO2:  [95 %-98 %] 97 %  BP: (111-149)/(60-76) 111/64     Weight: 86.6 kg (190 lb 14.4 oz)  Body mass index is 25.89 kg/m².    Intake/Output Summary (Last 24 hours) at 04/06/18 0828  Last data filed at 04/06/18 0551   Gross per 24 hour   Intake              480 ml   Output             1125 ml   Net             -645 ml      Physical Exam   Constitutional: He is oriented to person, place, and time. He appears well-developed. No distress.   HENT:   Head: Normocephalic and atraumatic.   Nose: Nose normal.   Mouth/Throat: Oropharynx is clear and moist. No oropharyngeal exudate.   Eyes: Conjunctivae and EOM are normal. No scleral icterus.   Cardiovascular: Normal rate, regular rhythm, normal heart sounds and intact distal pulses.  Exam reveals no gallop and no friction rub.    No murmur heard.  Pulmonary/Chest: Effort normal and breath sounds normal. No respiratory distress. He has no wheezes. He has no rales.   Abdominal: Soft. Bowel sounds are normal. He exhibits no distension and no mass. There is no tenderness. There is no guarding.   Musculoskeletal: He exhibits no edema, tenderness or deformity.   Neurological: He is alert and oriented to person, place, and time.    Skin: He is not diaphoretic.   R forearm abscess dressing removed, packing with some blood on it, no signs of surrounding cellulitis. Base of penis with induration and erythema, hair shaved, no fluctuance.        Significant Labs: All pertinent labs within the past 24 hours have been reviewed.    Significant Imaging: I have reviewed and interpreted all pertinent imaging results/findings within the past 24 hours.

## 2018-04-06 NOTE — PLAN OF CARE
Problem: Patient Care Overview  Goal: Plan of Care Review  Outcome: Ongoing (interventions implemented as appropriate)  Resting appropriately. IV antibiotics administered as ordered. Post void residual ~450. Denies urinary symptoms. NPO since midnight. Family at bedside, supportive and involved in care. Remains free from falls or trauma.

## 2018-04-06 NOTE — CONSULTS
A 58 year old male admitted 4/5/18 from home with cellulitis of right arm; abscess of right forearm; cellulitis of groin; hepatic steatosis; chronic hepatitis C; bladder wall thickening; enlarged prostate; essential hypertension; tobacco abuse  PMH: Abscess right forearm; HTN; smoker  4/5 I&D right forearm in ER  4/6 Incision and drainage left groin  4/5 WBC 7.96 Hgb 15.4 Hct 45.4   4/6 Alb 3.3  Consulted for right arm abscess S/P I&D  Assessment:  Photodocumentation    Right forearm- Abscess opening 1.5 circular wound with 1 cm undermining when probed with cotton swab and palpated. Noted dead space under skin roof, but filled with slough. Slough in base of wound. Draining a moderate amount pink fluid. Less edema surrounding opening.   Treatment:  Cleansed wound with NS. Dressed with Aquacel Ag dressing and covered with bordered gauze.   Since patient reports family with abscesses and abscess in groin, instructed patient to cleanse fingers with chlorhexidine gluconate and water using brush. Provided with basin and chlorhexidine gluconate to cleanse hands. Informed nursing of plan.

## 2018-04-06 NOTE — ASSESSMENT & PLAN NOTE
Not currently on any medications, though patient states that he is on Rx as outpatient.   BP currently well controlled  Continue to monitor  PRN hydralazine

## 2018-04-06 NOTE — ANESTHESIA POSTPROCEDURE EVALUATION
Anesthesia Post Evaluation    Patient: Jose Encalade    Procedure(s) Performed: Procedure(s) (LRB):  INCISION AND DRAINAGE (I & D) (Left)    Final Anesthesia Type: general  Patient location during evaluation: PACU  Patient participation: Yes- Able to Participate  Level of consciousness: awake and alert and oriented  Post-procedure vital signs: reviewed and stable  Pain management: adequate  Airway patency: patent  PONV status at discharge: No PONV  Anesthetic complications: no      Cardiovascular status: blood pressure returned to baseline and hemodynamically stable  Respiratory status: unassisted, spontaneous ventilation and room air  Hydration status: euvolemic  Follow-up not needed.        Visit Vitals  BP (!) 141/81 (BP Location: Left arm, Patient Position: Lying)   Pulse (!) 58   Temp 36.3 °C (97.3 °F) (Oral)   Resp 18   Ht 6' (1.829 m)   Wt 86.6 kg (190 lb 14.4 oz)   SpO2 97%   BMI 25.89 kg/m²       Pain/Roxana Score: Pain Assessment Performed: Yes (4/6/2018 12:30 PM)  Presence of Pain: denies (4/6/2018 12:30 PM)  Pain Rating Prior to Med Admin: 0 (4/6/2018 11:22 AM)  Pain Rating Post Med Admin: 0 (4/6/2018 12:00 PM)  Roxana Score: 10 (4/6/2018 12:00 PM)

## 2018-04-06 NOTE — OP NOTE
DATE OF PROCEDURE:  04/06/2018    SURGEON:  Mellisa Mcculolugh M.D.    PREOPERATIVE DIAGNOSIS:  Cellulitis and abscess.    POSTOPERATIVE DIAGNOSIS:  Cellulitis and abscess.    PROCEDURE PERFORMED:  Incision and drainage of left groin.    INDICATIONS FOR PROCEDURE:  Mr. Morales is a 58-year-old gentleman who   presented to Emergency Department with the right forearm abscess and a left   groin cellulitis/abscess.  CT scan did not show fluid collection upon admission   and there was no fluctuance to the wound on admission.  On hospital day #1, he   was noted to have more fluctuance around the area of the left groin wound near   the base of the penis.  For this reason, he was recommended to undergo incision   and drainage in the Operating Room.    PROCEDURE IN DETAIL:  Mr. Morales was brought to the Operating Room and placed   under general LMA anesthesia.  Full timeout procedures were performed   identifying the correct patient and procedure.  Appropriate IV antibiotics were   given, scheduled on the floor with vancomycin.  The patient was then prepped and   draped in the usual sterile fashion in a supine position.    A #15 blade scalpel was used to incise over the area of fluctuance at the left   base of the penis.  Thick purulent exudate was noted in the balloon.  Cultures   anaerobic and aerobic were taken at that time.  The incision was extended   approximately 3 cm to the left.  Hemostats were used to open the areas of   balloon to drain the purulent material from the wound.  Once all the purulence   was drained, the copious irrigation was done of the wound.  Once the wound was   adequately irrigated, the wound was inspected and noted to have no further   purulence or other abscess pockets noted.  The abscess did noted to track under   the skin towards the left.  There was a wide opening to this that was felt to be   easily packable postoperatively.    A 10 mL of local anesthetic was placed into the wound and  the wound was packed   with a 1-inch iodoform gauze.  This was placed again into the wound pocket.    Gauze dressing and ABD pad as well as mesh underwear was then placed as a final   dressing over the patient's left groin wound.    The patient was then awakened from general anesthesia and transferred to the   PACU in stable condition.    FINDINGS:  I and D of left groin and penile base abscess.  Thick purulent   material drained.    ESTIMATED BLOOD LOSS:  Less than 5 mL.    SPECIMENS:  Cultures.    DRAINS:  None.    PATIENT DISPOSITION:  The patient is stable and will be transferred back to the   regular nursing floor after postoperative recovery.  He will need wound care   instruction and packing of his wound.  Packing will need to be changed daily.      EKP/IN  dd: 04/06/2018 12:03:38 (CDT)  td: 04/06/2018 14:47:23 (CDT)  Doc ID   #[id number]  Job ID #[id number]    CC:

## 2018-04-06 NOTE — HOSPITAL COURSE
Admitted for R forearm abscess and groin cellulitis. R forearm abscess I&D performed in ED on 4/5. Started on vancomycin. Urology following. L groin abscess I&D performed on 4/6. HIV negative, A1c wnl. Wound cultures growing MRSA. Antibiotics changed to bactrim DS BID. Patient instructed on wound care and feels comfortable doing this. Also instructed on chlorhexidine scrub. Urology follow up and establish PCP.

## 2018-04-07 PROBLEM — L02.214 ABSCESS OF GROIN, LEFT: Status: ACTIVE | Noted: 2018-04-07

## 2018-04-07 LAB
ANION GAP SERPL CALC-SCNC: 8 MMOL/L
BACTERIA SPEC AEROBE CULT: NORMAL
BACTERIA UR CULT: NO GROWTH
BUN SERPL-MCNC: 10 MG/DL
CALCIUM SERPL-MCNC: 8.7 MG/DL
CHLORIDE SERPL-SCNC: 105 MMOL/L
CO2 SERPL-SCNC: 26 MMOL/L
CREAT SERPL-MCNC: 0.8 MG/DL
EST. GFR  (AFRICAN AMERICAN): >60 ML/MIN/1.73 M^2
EST. GFR  (NON AFRICAN AMERICAN): >60 ML/MIN/1.73 M^2
GLUCOSE SERPL-MCNC: 98 MG/DL
POTASSIUM SERPL-SCNC: 3.9 MMOL/L
SODIUM SERPL-SCNC: 139 MMOL/L

## 2018-04-07 PROCEDURE — 25000003 PHARM REV CODE 250: Performed by: ANESTHESIOLOGY

## 2018-04-07 PROCEDURE — 36415 COLL VENOUS BLD VENIPUNCTURE: CPT

## 2018-04-07 PROCEDURE — 25000003 PHARM REV CODE 250: Performed by: HOSPITALIST

## 2018-04-07 PROCEDURE — 80048 BASIC METABOLIC PNL TOTAL CA: CPT

## 2018-04-07 PROCEDURE — 25000003 PHARM REV CODE 250: Performed by: INTERNAL MEDICINE

## 2018-04-07 PROCEDURE — 11000001 HC ACUTE MED/SURG PRIVATE ROOM

## 2018-04-07 PROCEDURE — 99024 POSTOP FOLLOW-UP VISIT: CPT | Mod: ,,, | Performed by: UROLOGY

## 2018-04-07 PROCEDURE — 63600175 PHARM REV CODE 636 W HCPCS: Performed by: INTERNAL MEDICINE

## 2018-04-07 PROCEDURE — 63600175 PHARM REV CODE 636 W HCPCS: Performed by: HOSPITALIST

## 2018-04-07 PROCEDURE — 25000003 PHARM REV CODE 250: Performed by: UROLOGY

## 2018-04-07 PROCEDURE — 80202 ASSAY OF VANCOMYCIN: CPT

## 2018-04-07 PROCEDURE — S4991 NICOTINE PATCH NONLEGEND: HCPCS | Performed by: HOSPITALIST

## 2018-04-07 RX ADMIN — VANCOMYCIN HYDROCHLORIDE 1250 MG: 1 INJECTION, POWDER, LYOPHILIZED, FOR SOLUTION INTRAVENOUS at 08:04

## 2018-04-07 RX ADMIN — VANCOMYCIN HYDROCHLORIDE 1250 MG: 1 INJECTION, POWDER, LYOPHILIZED, FOR SOLUTION INTRAVENOUS at 04:04

## 2018-04-07 RX ADMIN — DOCUSATE SODIUM AND SENNOSIDES 1 TABLET: 8.6; 5 TABLET, FILM COATED ORAL at 08:04

## 2018-04-07 RX ADMIN — VANCOMYCIN HYDROCHLORIDE 1250 MG: 1 INJECTION, POWDER, LYOPHILIZED, FOR SOLUTION INTRAVENOUS at 12:04

## 2018-04-07 RX ADMIN — OXYCODONE HYDROCHLORIDE AND ACETAMINOPHEN 1 TABLET: 5; 325 TABLET ORAL at 09:04

## 2018-04-07 RX ADMIN — NICOTINE 1 PATCH: 21 PATCH, EXTENDED RELEASE TRANSDERMAL at 08:04

## 2018-04-07 RX ADMIN — OXYCODONE HYDROCHLORIDE AND ACETAMINOPHEN 1 TABLET: 5; 325 TABLET ORAL at 02:04

## 2018-04-07 RX ADMIN — TAMSULOSIN HYDROCHLORIDE 0.4 MG: 0.4 CAPSULE ORAL at 08:04

## 2018-04-07 NOTE — ASSESSMENT & PLAN NOTE
Pt with progressive symptoms of dysuria with outlet obstruction  Likely symptomatic BPH  Urology already consulted  Will check PSA level- normal  Started tamulosin   Will need outpatient Urology follow upon discharge

## 2018-04-07 NOTE — PROGRESS NOTES
"[facility] Medicine  Progress Note    Patient Name: Jose Morales  MRN: [id number]  Patient Class: IP- Inpatient   Admission Date: 4/5/2018  Length of Stay: 2 days  Attending Physician: Kamila Burton MD  Primary Care Provider: Primary Doctor No        Subjective:     Principal Problem:Cellulitis of right arm    HPI:  59 y/o male with HTN, Hep C, +TOB and h/o abscess to R thigh/knee area with Staph infection prior to 2005 (pre-Leonor, unknown if it was MRSA vs MSSA) who presented with c/o right forearm "boil" that began 1 week ago and also with another lesion to his groin that he noticed 4 days ago.  Patient reported he intially thought he had a spider bite on R forearm, but it continued to increase in size. He squeezed it and it drained pus and blood continuously and again started to increase in size and redness surrounding the boil. He denies F/C and no reported trauma or injury to that site. Wife and son reported similar boils which got better for them this week as well. In the ER, an I&D was performed on the R forearm abscess and packing was placed, wound and blood cultures were sent off and he was given a dose of Vancomycin.    Hospital Course:  Admitted for R forearm abscess and groin cellulitis. R forearm abscess I&D performed in ED on 4/5. Started on vancomycin. Urology following. L groin abscess I&D performed on 4/6. HIV negative, A1c wnl. Wound cultures growing MRSA.     Interval History: No complaints this Am. No pain at I&D sites.      Review of Systems   Constitutional: Negative for chills and fever.   Respiratory: Negative for cough, chest tightness and shortness of breath.    Cardiovascular: Negative for chest pain.   Gastrointestinal: Negative for abdominal pain, constipation, diarrhea, nausea and vomiting.   Genitourinary: Negative for decreased urine volume and difficulty urinating.   Skin: Positive for rash and wound.     Objective:     Vital Signs (Most " Recent):  Temp: 97.9 °F (36.6 °C) (04/07/18 0756)  Pulse: 69 (04/07/18 0756)  Resp: 17 (04/07/18 0756)  BP: 137/76 (04/07/18 0756)  SpO2: 96 % (04/07/18 0756) Vital Signs (24h Range):  Temp:  [96.4 °F (35.8 °C)-98.3 °F (36.8 °C)] 97.9 °F (36.6 °C)  Pulse:  [56-72] 69  Resp:  [17-18] 17  SpO2:  [94 %-97 %] 96 %  BP: (114-141)/(61-81) 137/76     Weight: 86.6 kg (190 lb 14.4 oz)  Body mass index is 25.89 kg/m².    Intake/Output Summary (Last 24 hours) at 04/07/18 1156  Last data filed at 04/07/18 0900   Gross per 24 hour   Intake              720 ml   Output              600 ml   Net              120 ml      Physical Exam   Constitutional: He is oriented to person, place, and time. He appears well-developed. No distress.   HENT:   Head: Normocephalic and atraumatic.   Nose: Nose normal.   Mouth/Throat: Oropharynx is clear and moist. No oropharyngeal exudate.   Eyes: Conjunctivae and EOM are normal. No scleral icterus.   Cardiovascular: Normal rate, regular rhythm, normal heart sounds and intact distal pulses.  Exam reveals no gallop and no friction rub.    No murmur heard.  Pulmonary/Chest: Effort normal and breath sounds normal. No respiratory distress. He has no wheezes. He has no rales.   Abdominal: Soft. Bowel sounds are normal. He exhibits no distension and no mass. There is no tenderness. There is no guarding.   Musculoskeletal: He exhibits no edema, tenderness or deformity.   Neurological: He is alert and oriented to person, place, and time.   Skin: He is not diaphoretic.   R forearm dressing intact. Groin dressing intact.         Significant Labs: All pertinent labs within the past 24 hours have been reviewed.    Significant Imaging: I have reviewed and interpreted all pertinent imaging results/findings within the past 24 hours.    Assessment/Plan:      * Cellulitis of right arm    Pt with abscess s/p I&D done in the ER with surrounding cellulitis  Does not meet criteria for sepsis  Will f/u on wound and blood  cultures- blood cultures NGTD, wound cultures growing MRSA  Continue vanc  Switch to bactrim on discharge        Abscess of groin, left    S/p I&D on 4/6 by Urology          Chronic hepatitis C virus infection    As discussed above  Outpatient GI referral for curative treatment (discussed with the patient and family)        Enlarged prostate    As discussed above        Tobacco abuse    Smoking cessation counseling done  Nicotine patch while inpatient        Abscess of right forearm    As discussed above  Wound care consulted        Essential hypertension    Not currently on any medications, though patient states that he is on Rx as outpatient.   BP currently well controlled  Continue to monitor  PRN hydralazine        Bladder wall thickening    Pt with progressive symptoms of dysuria with outlet obstruction  Likely symptomatic BPH  Urology already consulted  Will check PSA level- normal  Started tamulosin   Will need outpatient Urology follow upon discharge        Hepatic steatosis    Noted on CT scan and with abnormal LFTS due to Hep C  Pt and family counseled on findings and educated on need to f/u with GI to treat Hep C as outpatient  Will follow LFTs- improved         Cellulitis of groin    Imaging with CT not consistent with abscess, but just cellulitis  Developed abscess  I&D performed by Urology on 4/6  Treatment with Vancomycin as discussed above          VTE Risk Mitigation         Ordered     IP VTE LOW RISK PATIENT  Once      04/05/18 1647     Place SARAH hose  Until discontinued      04/05/18 1647     Place sequential compression device  Until discontinued      04/05/18 1647              Kamila Burton MD  Department of Hospital Medicine   [facility]

## 2018-04-07 NOTE — PROGRESS NOTES
RN called MD to inform of Vanc trough of 7.2, current order Vanc 1250mg IV Q12. MD ordered to keep same dose, but change frequency from Q12 to Q8 and place order for a trough before the fourth dose.

## 2018-04-07 NOTE — ASSESSMENT & PLAN NOTE
Sp ID  Doing well    Cont bactrim  Pt will wash wound in shower bid and pack with iodoform  He should be able to do this himself    Follow up with Dr Mccullough, Dr Becerra or Tiffany krishnamurthy NP in 1-2 weeks

## 2018-04-07 NOTE — ASSESSMENT & PLAN NOTE
Noted on CT scan and with abnormal LFTS due to Hep C  Pt and family counseled on findings and educated on need to f/u with GI to treat Hep C as outpatient  Will follow LFTs- improved

## 2018-04-07 NOTE — SUBJECTIVE & OBJECTIVE
Interval History: No complaints this Am. No pain at I&D sites.      Review of Systems   Constitutional: Negative for chills and fever.   Respiratory: Negative for cough, chest tightness and shortness of breath.    Cardiovascular: Negative for chest pain.   Gastrointestinal: Negative for abdominal pain, constipation, diarrhea, nausea and vomiting.   Genitourinary: Negative for decreased urine volume and difficulty urinating.   Skin: Positive for rash and wound.     Objective:     Vital Signs (Most Recent):  Temp: 97.9 °F (36.6 °C) (04/07/18 0756)  Pulse: 69 (04/07/18 0756)  Resp: 17 (04/07/18 0756)  BP: 137/76 (04/07/18 0756)  SpO2: 96 % (04/07/18 0756) Vital Signs (24h Range):  Temp:  [96.4 °F (35.8 °C)-98.3 °F (36.8 °C)] 97.9 °F (36.6 °C)  Pulse:  [56-72] 69  Resp:  [17-18] 17  SpO2:  [94 %-97 %] 96 %  BP: (114-141)/(61-81) 137/76     Weight: 86.6 kg (190 lb 14.4 oz)  Body mass index is 25.89 kg/m².    Intake/Output Summary (Last 24 hours) at 04/07/18 1156  Last data filed at 04/07/18 0900   Gross per 24 hour   Intake              720 ml   Output              600 ml   Net              120 ml      Physical Exam   Constitutional: He is oriented to person, place, and time. He appears well-developed. No distress.   HENT:   Head: Normocephalic and atraumatic.   Nose: Nose normal.   Mouth/Throat: Oropharynx is clear and moist. No oropharyngeal exudate.   Eyes: Conjunctivae and EOM are normal. No scleral icterus.   Cardiovascular: Normal rate, regular rhythm, normal heart sounds and intact distal pulses.  Exam reveals no gallop and no friction rub.    No murmur heard.  Pulmonary/Chest: Effort normal and breath sounds normal. No respiratory distress. He has no wheezes. He has no rales.   Abdominal: Soft. Bowel sounds are normal. He exhibits no distension and no mass. There is no tenderness. There is no guarding.   Musculoskeletal: He exhibits no edema, tenderness or deformity.   Neurological: He is alert and oriented to  person, place, and time.   Skin: He is not diaphoretic.   R forearm dressing intact. Groin dressing intact.         Significant Labs: All pertinent labs within the past 24 hours have been reviewed.    Significant Imaging: I have reviewed and interpreted all pertinent imaging results/findings within the past 24 hours.

## 2018-04-07 NOTE — PLAN OF CARE
Problem: Patient Care Overview  Goal: Plan of Care Review  Pt fever free. Dressing to R fa intact with slight shadowing outlined, L groin draining well, pt pain control with PRN IV med. Pt vitals stable. No falls or injury. AOx4

## 2018-04-07 NOTE — PLAN OF CARE
Problem: Patient Care Overview  Goal: Plan of Care Review  Monitored freq.throughout the shift. Pt.resting at present with no complaints and no acute distress noted. Med.for pain as needed & med.helpful. Remains on contact isolation for MRSA(info.given to pt. on MRSA earlier & pt.verbalizes understanding of condition.) iv antibiotic continue as ordered. Drsg.to RFA changed earlier as ordered. Dr. Arguelles visited earlier & looked at lt.groin incis. -drsg.remains dry & intact. Call light in reach.

## 2018-04-07 NOTE — ASSESSMENT & PLAN NOTE
Pt with abscess s/p I&D done in the ER with surrounding cellulitis  Does not meet criteria for sepsis  Will f/u on wound and blood cultures- blood cultures NGTD, wound cultures growing MRSA  Continue vanc  Switch to bactrim on discharge

## 2018-04-07 NOTE — ASSESSMENT & PLAN NOTE
Imaging with CT not consistent with abscess, but just cellulitis  Developed abscess  I&D performed by Urology on 4/6  Treatment with Vancomycin as discussed above

## 2018-04-07 NOTE — PROGRESS NOTES
Pt wound care performed today in the 1600hr by wound care nurse. Dressing intact. Shadowing outlined.

## 2018-04-07 NOTE — SUBJECTIVE & OBJECTIVE
Interval History:     Eager to go home  Feels well  No fever  No chills      Review of Systems  Objective:     Temp:  [96.4 °F (35.8 °C)-98.3 °F (36.8 °C)] 97.9 °F (36.6 °C)  Pulse:  [56-90] 69  Resp:  [12-18] 17  SpO2:  [94 %-100 %] 96 %  BP: (114-141)/(61-81) 137/76     Body mass index is 25.89 kg/m².      Date 04/07/18 0700 - 04/08/18 0659   Shift 3751-6216 1809-5350 2174-4520 24 Hour Total   I  N  T  A  K  E   P.O. 240   240    Shift Total  (mL/kg) 240  (2.8)   240  (2.8)   O  U  T  P  U  T   Shift Total  (mL/kg)       Weight (kg) 86.6 86.6 86.6 86.6     Bladder Scan Volume (mL): 494 mL (post void) (04/05/18 2000)    Drains          No matching active lines, drains, or airways          Physical Exam    Left groin ID site  Clean   No erythema    Changed iodoform gauze  Instructed pt how to change iodoform guaze  Gave pt iodoform gauze, 4*4 and tape    Significant Labs:    BMP:    Recent Labs  Lab 04/05/18  1000 04/06/18  0449 04/07/18  0434    137 139   K 4.4 3.9 3.9    103 105   CO2 30* 26 26   BUN 9 9 10   CREATININE 0.8 0.8 0.8   CALCIUM 9.7 8.8 8.7       CBC:     Recent Labs  Lab 04/05/18  1000   WBC 7.96   HGB 15.4   HCT 45.4          Blood Culture:   Recent Labs  Lab 04/05/18  1002 04/05/18  1018   LABBLOO No Growth to date  No Growth to date  No Growth to date No Growth to date  No Growth to date  No Growth to date     Urine Culture:   Recent Labs  Lab 04/05/18  0949   LABURIN No growth     wound cs MRSA    Significant Imaging:  -

## 2018-04-08 ENCOUNTER — TELEPHONE (OUTPATIENT)
Dept: HEPATOLOGY | Facility: HOSPITAL | Age: 58
End: 2018-04-08

## 2018-04-08 VITALS
BODY MASS INDEX: 25.85 KG/M2 | HEART RATE: 56 BPM | HEIGHT: 72 IN | SYSTOLIC BLOOD PRESSURE: 168 MMHG | DIASTOLIC BLOOD PRESSURE: 80 MMHG | RESPIRATION RATE: 17 BRPM | WEIGHT: 190.88 LBS | TEMPERATURE: 98 F | OXYGEN SATURATION: 99 %

## 2018-04-08 LAB
ANION GAP SERPL CALC-SCNC: 7 MMOL/L
BACTERIA SPEC AEROBE CULT: NORMAL
BACTERIA SPEC AEROBE CULT: NORMAL
BASOPHILS # BLD AUTO: 0.01 K/UL
BASOPHILS NFR BLD: 0.2 %
BUN SERPL-MCNC: 6 MG/DL
CALCIUM SERPL-MCNC: 8.4 MG/DL
CHLORIDE SERPL-SCNC: 107 MMOL/L
CO2 SERPL-SCNC: 26 MMOL/L
CREAT SERPL-MCNC: 0.8 MG/DL
DIFFERENTIAL METHOD: ABNORMAL
EOSINOPHIL # BLD AUTO: 0.1 K/UL
EOSINOPHIL NFR BLD: 2.2 %
ERYTHROCYTE [DISTWIDTH] IN BLOOD BY AUTOMATED COUNT: 12.4 %
EST. GFR  (AFRICAN AMERICAN): >60 ML/MIN/1.73 M^2
EST. GFR  (NON AFRICAN AMERICAN): >60 ML/MIN/1.73 M^2
GLUCOSE SERPL-MCNC: 104 MG/DL
HCT VFR BLD AUTO: 40.6 %
HGB BLD-MCNC: 13.9 G/DL
LYMPHOCYTES # BLD AUTO: 1.4 K/UL
LYMPHOCYTES NFR BLD: 25.6 %
MCH RBC QN AUTO: 32.7 PG
MCHC RBC AUTO-ENTMCNC: 34.2 G/DL
MCV RBC AUTO: 96 FL
MONOCYTES # BLD AUTO: 0.4 K/UL
MONOCYTES NFR BLD: 8 %
NEUTROPHILS # BLD AUTO: 3.5 K/UL
NEUTROPHILS NFR BLD: 64 %
PLATELET # BLD AUTO: 207 K/UL
PMV BLD AUTO: 9.9 FL
POTASSIUM SERPL-SCNC: 3.6 MMOL/L
RBC # BLD AUTO: 4.25 M/UL
SODIUM SERPL-SCNC: 140 MMOL/L
VANCOMYCIN TROUGH SERPL-MCNC: 12.9 UG/ML
WBC # BLD AUTO: 5.51 K/UL

## 2018-04-08 PROCEDURE — 99024 POSTOP FOLLOW-UP VISIT: CPT | Mod: ,,, | Performed by: UROLOGY

## 2018-04-08 PROCEDURE — 25000003 PHARM REV CODE 250: Performed by: UROLOGY

## 2018-04-08 PROCEDURE — 85025 COMPLETE CBC W/AUTO DIFF WBC: CPT

## 2018-04-08 PROCEDURE — 25000003 PHARM REV CODE 250: Performed by: HOSPITALIST

## 2018-04-08 PROCEDURE — S4991 NICOTINE PATCH NONLEGEND: HCPCS | Performed by: HOSPITALIST

## 2018-04-08 PROCEDURE — 63600175 PHARM REV CODE 636 W HCPCS: Performed by: INTERNAL MEDICINE

## 2018-04-08 PROCEDURE — 36415 COLL VENOUS BLD VENIPUNCTURE: CPT

## 2018-04-08 PROCEDURE — 25000003 PHARM REV CODE 250: Performed by: INTERNAL MEDICINE

## 2018-04-08 PROCEDURE — 80048 BASIC METABOLIC PNL TOTAL CA: CPT

## 2018-04-08 RX ORDER — SULFAMETHOXAZOLE AND TRIMETHOPRIM 800; 160 MG/1; MG/1
1 TABLET ORAL 2 TIMES DAILY
Qty: 20 TABLET | Refills: 0 | Status: SHIPPED | OUTPATIENT
Start: 2018-04-08 | End: 2018-04-18

## 2018-04-08 RX ORDER — LISINOPRIL 10 MG/1
10 TABLET ORAL DAILY
COMMUNITY
End: 2018-05-29

## 2018-04-08 RX ORDER — TAMSULOSIN HYDROCHLORIDE 0.4 MG/1
0.4 CAPSULE ORAL DAILY
Qty: 90 CAPSULE | Refills: 3 | Status: SHIPPED | OUTPATIENT
Start: 2018-04-08 | End: 2021-09-15 | Stop reason: SDUPTHER

## 2018-04-08 RX ORDER — SULFAMETHOXAZOLE AND TRIMETHOPRIM 800; 160 MG/1; MG/1
1 TABLET ORAL 2 TIMES DAILY
Status: DISCONTINUED | OUTPATIENT
Start: 2018-04-08 | End: 2018-04-08 | Stop reason: HOSPADM

## 2018-04-08 RX ADMIN — TRAMADOL HYDROCHLORIDE 50 MG: 50 TABLET, FILM COATED ORAL at 12:04

## 2018-04-08 RX ADMIN — SULFAMETHOXAZOLE AND TRIMETHOPRIM 1 TABLET: 800; 160 TABLET ORAL at 10:04

## 2018-04-08 RX ADMIN — VANCOMYCIN HYDROCHLORIDE 1250 MG: 1 INJECTION, POWDER, LYOPHILIZED, FOR SOLUTION INTRAVENOUS at 02:04

## 2018-04-08 RX ADMIN — TAMSULOSIN HYDROCHLORIDE 0.4 MG: 0.4 CAPSULE ORAL at 08:04

## 2018-04-08 RX ADMIN — NICOTINE 1 PATCH: 21 PATCH, EXTENDED RELEASE TRANSDERMAL at 08:04

## 2018-04-08 NOTE — DISCHARGE SUMMARY
"[facility] Medicine  Discharge Summary      Patient Name: Jose Morales  MRN: [id number]  Admission Date: 4/5/2018  Hospital Length of Stay: 3 days  Discharge Date and Time:  04/08/2018 4:41 PM  Attending Physician: Lorrie att. providers found   Discharging Provider: Kamila Burton MD  Primary Care Provider: Primary Doctor Lorrie      HPI:   57 y/o male with HTN, Hep C, +TOB and h/o abscess to R thigh/knee area with Staph infection prior to 2005 (pre-Leonor, unknown if it was MRSA vs MSSA) who presented with c/o right forearm "boil" that began 1 week ago and also with another lesion to his groin that he noticed 4 days ago.  Patient reported he intially thought he had a spider bite on R forearm, but it continued to increase in size. He squeezed it and it drained pus and blood continuously and again started to increase in size and redness surrounding the boil. He denies F/C and no reported trauma or injury to that site. Wife and son reported similar boils which got better for them this week as well. In the ER, an I&D was performed on the R forearm abscess and packing was placed, wound and blood cultures were sent off and he was given a dose of Vancomycin.    Procedure(s) (LRB):  INCISION AND DRAINAGE (I & D) (Left)      Hospital Course:   Admitted for R forearm abscess and groin cellulitis. R forearm abscess I&D performed in ED on 4/5. Started on vancomycin. Urology following. L groin abscess I&D performed on 4/6. HIV negative, A1c wnl. Wound cultures growing MRSA. Antibiotics changed to bactrim DS BID. Patient instructed on wound care and feels comfortable doing this. Also instructed on chlorhexidine scrub. Urology follow up and establish PCP.      Consults:   Consults         Status Ordering Provider     Inpatient consult to Urology  Once     Provider:  Mellisa Mccullough MD    Completed DAVON GRAY          * Cellulitis of right arm    Pt with abscess s/p I&D done in the ER with " surrounding cellulitis  Does not meet criteria for sepsis  Will f/u on wound and blood cultures- blood cultures NGTD, wound cultures growing MRSA  Given vanc on admit  Switch to bactrim on discharge        Abscess of groin, left    S/p I&D on 4/6 by Urology  Wound care: Pt will wash wound in shower bid and pack with iodoform per Urology  Urology follow up          Chronic hepatitis C virus infection    As discussed above  Outpatient GI referral for curative treatment (discussed with the patient and family)        Enlarged prostate    As discussed above        Tobacco abuse    Smoking cessation counseling done  Nicotine patch while inpatient        Abscess of right forearm    As discussed above  Wound care consulted- Cleanse wound with NS, Dressed with Aquacel Ag dressing and covered with bordered gauze. Also instructed on chlorhexidine scrub        Essential hypertension    Not currently on any medications, though patient states that he is on Rx as outpatient.   BP currently well controlled  Continue to monitor  PRN hydralazine        Bladder wall thickening    Pt with progressive symptoms of dysuria with outlet obstruction  Likely symptomatic BPH  Urology already consulted  Will check PSA level- normal  Started tamulosin --> called Rx into patient's pharmacy as it was not on discharge list.   Will need outpatient Urology follow upon discharge        Hepatic steatosis    Noted on CT scan and with abnormal LFTS due to Hep C  Pt and family counseled on findings and educated on need to f/u with GI to treat Hep C as outpatient  Will follow LFTs- improved         Cellulitis of groin    Imaging with CT not consistent with abscess, but just cellulitis  Developed abscess  I&D performed by Urology on 4/6  Change to PO bactrim to complete treatment course          Final Active Diagnoses:    Diagnosis Date Noted POA    PRINCIPAL PROBLEM:  Cellulitis of right arm [L03.113] 04/05/2018 Yes    Abscess of groin, left [L02.214]  04/07/2018 Yes    Cellulitis of groin [L03.314] 04/05/2018 Yes    Hepatic steatosis [K76.0] 04/05/2018 Yes    Bladder wall thickening [N32.89] 04/05/2018 Yes    Essential hypertension [I10] 04/05/2018 Yes    Abscess of right forearm [L02.413] 04/05/2018 Yes    Tobacco abuse [Z72.0] 04/05/2018 Yes    Enlarged prostate [N40.0] 04/05/2018 Yes    Chronic hepatitis C virus infection [B18.2] 04/05/2018 Yes      Problems Resolved During this Admission:    Diagnosis Date Noted Date Resolved POA       Discharged Condition: good    Disposition: Home or Self Care    Follow Up:  Follow-up Information     [facility] [place] In 1 week.    Why:  Will need to schedule appointment with clinic for [place] [facility]. They are closed on Fridays  Contact information:  [address] [facility]  [place] [address]  [phone]             [facility] [place].    Why:  This is the location in [place]  Contact information:  [address] [facility] [address]  [phone]             Mellisa Mccullough MD On 4/13/2018.    Specialty:  Urology  Why:  WILL SEE NURSE PRACTITIONER  Contact information:  [address] [address]  [phone]             Cassie Marti NP On 4/13/2018.    Specialty:  Urology  Why:  out patient services:10:30 AM follow up from the hospital.  Had I&D  Contact information:  [address] [place] [address]  [phone]                 Patient Instructions:     Diet Adult Regular     Activity as tolerated     Notify your health care provider if you experience any of the following:  temperature >100.4     Notify your health care provider if you experience any of the following:  persistent nausea and vomiting or diarrhea     Notify your health care provider if you experience any of the following:  severe uncontrolled pain     Notify your health care provider if you experience any of the following:  redness, tenderness, or signs of infection  (pain, swelling, redness, odor or green/yellow discharge around incision site)     Notify your health care provider if you experience any of the following:  difficulty breathing or increased cough     Notify your health care provider if you experience any of the following:  severe persistent headache     Notify your health care provider if you experience any of the following:  worsening rash     Notify your health care provider if you experience any of the following:  persistent dizziness, light-headedness, or visual disturbances     Notify your health care provider if you experience any of the following:  increased confusion or weakness         Significant Diagnostic Studies: Labs:   BMP:   Recent Labs  Lab 04/07/18  0434 04/08/18  0629   GLU 98 104    140   K 3.9 3.6    107   CO2 26 26   BUN 10 6   CREATININE 0.8 0.8   CALCIUM 8.7 8.4*    and CBC   Recent Labs  Lab 04/08/18  0629   WBC 5.51   HGB 13.9*   HCT 40.6          Pending Diagnostic Studies:     None         Medications:  Reconciled Home Medications:      Medication List      START taking these medications    sulfamethoxazole-trimethoprim 800-160mg 800-160 mg Tab  Commonly known as:  BACTRIM DS  Take 1 tablet by mouth 2 (two) times daily.        CONTINUE taking these medications    lisinopril 10 MG tablet        STOP taking these medications    UNKNOWN TO PATIENT           Where to Get Your Medications      You can get these medications from any pharmacy    Bring a paper prescription for each of these medications  · sulfamethoxazole-trimethoprim 800-160mg 800-160 mg Tab       **Also e-prescribed tamsulosin 0.4mg daily to patient's pharmacy    Indwelling Lines/Drains at time of discharge:   Lines/Drains/Airways          No matching active lines, drains, or airways          Time spent on the discharge of patient: 30 minutes  Patient was seen and examined on the date of discharge and determined to be suitable for discharge.         Kamila PENG  MD Micheal  Department of Hospital Medicine  [facility]

## 2018-04-08 NOTE — PROGRESS NOTES
[facility]  Urology  Progress Note    Patient Name: Jose Morales  MRN: [id number]  Admission Date: 4/5/2018  Hospital Length of Stay: 3 days  Code Status: Full Code   Attending Provider: Kamila Burton MD   Primary Care Physician: Primary Doctor No    Subjective:     HPI:  57yo M presented to ER with abscess on R arm and infection in groin area. He reports h/o recurrent abscess formation. R forearm abscess noted x 1 week with drainage with manipulation prior to admission. L groin swelling noted about 4 days ago. No drainage from groin area. 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 Denies fevers.     He reports significant LUTS that he has not sought care. Nocturia x 3-4 with weak stream. No known fam hx of PCa. No prior PCa screening.     CT scan - no obvious fluid collection in L groin to midline around penis, edema noted in soft tissues. No air in area of infection. Bladder moderately distended, no hydronephrosis. Large prostate.     Interval History:     Eager to go home  Feels well  No fever  No chills      Review of Systems  Objective:     Temp:  [96.4 °F (35.8 °C)-98.3 °F (36.8 °C)] 97.9 °F (36.6 °C)  Pulse:  [56-90] 69  Resp:  [12-18] 17  SpO2:  [94 %-100 %] 96 %  BP: (114-141)/(61-81) 137/76     Body mass index is 25.89 kg/m².      Date 04/07/18 0700 - 04/08/18 0659   Shift 2155-8856 1881-8761 0169-0461 24 Hour Total   I  N  T  A  K  E   P.O. 240   240    Shift Total  (mL/kg) 240  (2.8)   240  (2.8)   O  U  T  P  U  T   Shift Total  (mL/kg)       Weight (kg) 86.6 86.6 86.6 86.6     Bladder Scan Volume (mL): 494 mL (post void) (04/05/18 2000)    Drains          No matching active lines, drains, or airways          Physical Exam    Left groin ID site  Clean   No erythema    Changed iodoform gauze  Instructed pt how to change iodoform guaze  Gave pt iodoform gauze, 4*4 and tape    Significant Labs:    BMP:    Recent Labs  Lab 04/05/18  1000 04/06/18  0449 04/07/18  0434   NA  138 137 139   K 4.4 3.9 3.9    103 105   CO2 30* 26 26   BUN 9 9 10   CREATININE 0.8 0.8 0.8   CALCIUM 9.7 8.8 8.7       CBC:     Recent Labs  Lab 04/05/18  1000   WBC 7.96   HGB 15.4   HCT 45.4          Blood Culture:   Recent Labs  Lab 04/05/18  1002 04/05/18  1018   LABBLOO No Growth to date  No Growth to date  No Growth to date No Growth to date  No Growth to date  No Growth to date     Urine Culture:   Recent Labs  Lab 04/05/18  0949   LABURIN No growth     wound cs MRSA    Significant Imaging:  -                  Assessment/Plan:     Enlarged prostate     - Significant LUTS   - Started Flomax now   - Check PVR   - PSA   - May need TURP in future if limited improvement with pharmacotherapy        Bladder wall thickening     - Suspect 2/2 WEAVER due to enlarged prostate   - May be infectious. UCx ordered.   - Start Flomax   - Will need PSA/NICO. PSA in AM - may be falsely elevated if UTI present        Cellulitis of groin    Sp ID  Doing well    Cont bactrim  Pt will wash wound in shower bid and pack with iodoform  He should be able to do this himself    Follow up with Dr Mccullough, Dr Becerra or Tiffany krishnamurthy NP in 1-2 weeks            VTE Risk Mitigation         Ordered     IP VTE LOW RISK PATIENT  Once      04/05/18 1647     Place SARAH hose  Until discontinued      04/05/18 1647     Place sequential compression device  Until discontinued      04/05/18 1647          Shayan Arguelles MD  Urology  [facility]

## 2018-04-08 NOTE — PLAN OF CARE
"   04/08/18 1237   Final Note   Assessment Type Final Discharge Note   Discharge Disposition Home   What phone number can be called within the next 1-3 days to see how you are doing after discharge? ([phone])   Hospital Follow Up  Appt(s) scheduled? Yes   Discharge plans and expectations educations in teach back method with documentation complete? Yes   Right Care Referral Info   Post Acute Recommendation No Care   EDUCATION: Discussed with Mr Mckeon educational information on abcess.   Information included:  signs and symptoms to look for and call the doctor if experiencing, and symptoms that may indicate a medical emergency: CALL 911.      All questions answered.  Teach back method used.  Patient verbalized understanding of all information by stating, " I will call my doctor for a fever and call 911 for CP and  / or SOB".    NurseIvana notified that all CM needs are met.            "

## 2018-04-08 NOTE — PROGRESS NOTES
[facility]    WRITTEN HEALTHCARE AND DISCHARGE INFORMATION    Follow-up Information     [facility] In 1 week.    Why:  Will need to schedule appointment with clinic for [place] location. They are closed on Fridays  Contact information:  [address] [place] [address]  [phone]             [facility] [place].    Why:  This is the location in [place]  Contact information:  [address]  [place] [address]  [phone]             Mellisa Mccullough MD On 4/13/2018.    Specialty:  Urology  Why:  WILL SEE NURSE PRACTITIONER  Contact information:  [address]  [address]  [place] [address]  [phone]             Cassie Marti NP On 4/13/2018.    Specialty:  Urology  Why:  out patient services:10:30 AM follow up from the hospital.  Had I&D  Contact information:  [address]  [place] [address]  [phone]                         Help at Home           [phone]  After discharge for assistance [facility] On Call Nurse Care Line 24/7  Assistance     Things You are responsible For To Manage Your Care At Home:  1.    Getting your prescriptions filled   2.    Taking your medications as directed, DO NOT MISS ANY DOSES!  3.    Going to your follow-up doctor appointment. This is important because it  allow the doctor to monitor your progress and determine if  any changes need to made to your treatment plan.     Thank you for choosing [facility] for your care.  Please answer any calls you may receive from [facility] we want to continue to support you as you manage your healthcare needs. [facility] is happy to have the opportunity to serve you.      Sincerely,  Your [facility] Team,  Nina RN, ACM-RN; [facility]  [phone]            Above information printed and presented to patient who verbalized understanding

## 2018-04-08 NOTE — ASSESSMENT & PLAN NOTE
Imaging with CT not consistent with abscess, but just cellulitis  Developed abscess  I&D performed by Urology on 4/6  Change to PO bactrim to complete treatment course

## 2018-04-08 NOTE — ASSESSMENT & PLAN NOTE
S/p I&D on 4/6 by Urology  Wound care: Pt will wash wound in shower bid and pack with iodoform per Urology  Urology follow up

## 2018-04-08 NOTE — ASSESSMENT & PLAN NOTE
As discussed above  Wound care consulted- Cleanse wound with NS, Dressed with Aquacel Ag dressing and covered with bordered gauze. Also instructed on chlorhexidine scrub

## 2018-04-08 NOTE — PROGRESS NOTES
&  visited with new orders noted. Pt. Discharged. Saline lock d/c. Discharge instr.given. Drsg.to lt.groin changed by  & pt.instr.by doctor on wound care at home by doctor. Dressing to rfa changed & pt. instructed on wound care at home. Follow up as per case management. awaiting ride home.

## 2018-04-08 NOTE — TELEPHONE ENCOUNTER
Tamsulosin accidentally not prescribed on discharge. Called and discussed with patient's wife. Prescribed to patient's pharmacy. All questions answered.   Kamila Burton MD  [facility]  04/08/2018 4:45 PM

## 2018-04-08 NOTE — ASSESSMENT & PLAN NOTE
Pt with progressive symptoms of dysuria with outlet obstruction  Likely symptomatic BPH  Urology already consulted  Will check PSA level- normal  Started tamulosin --> called Rx into patient's pharmacy as it was not on discharge list.   Will need outpatient Urology follow upon discharge

## 2018-04-08 NOTE — PLAN OF CARE
Problem: Patient Care Overview  Goal: Plan of Care Review  Pt sx dressing to R FA intact, sx packing to L groin area in place. No additional changes to skin. Pt fever free. Comfort level met via prn oral meds. Pt tolerates IV antibiotics without reaction. Pt refuses colace. No s/s of distress. Free of falls and injury.

## 2018-04-08 NOTE — ASSESSMENT & PLAN NOTE
Pt with abscess s/p I&D done in the ER with surrounding cellulitis  Does not meet criteria for sepsis  Will f/u on wound and blood cultures- blood cultures NGTD, wound cultures growing MRSA  Given vanc on admit  Switch to bactrim on discharge

## 2018-04-10 LAB
BACTERIA BLD CULT: NORMAL
BACTERIA BLD CULT: NORMAL
BACTERIA SPEC ANAEROBE CULT: NORMAL
BACTERIA SPEC ANAEROBE CULT: NORMAL

## 2018-04-13 ENCOUNTER — OFFICE VISIT (OUTPATIENT)
Dept: UROLOGY | Facility: CLINIC | Age: 58
End: 2018-04-13

## 2018-04-13 VITALS — BODY MASS INDEX: 27.17 KG/M2 | WEIGHT: 200.63 LBS | HEIGHT: 72 IN | RESPIRATION RATE: 14 BRPM

## 2018-04-13 DIAGNOSIS — L02.214 ABSCESS OF GROIN, LEFT: Primary | ICD-10-CM

## 2018-04-13 DIAGNOSIS — N40.0 ENLARGED PROSTATE: ICD-10-CM

## 2018-04-13 DIAGNOSIS — N32.89 BLADDER WALL THICKENING: ICD-10-CM

## 2018-04-13 LAB
BILIRUB SERPL-MCNC: NORMAL MG/DL
BLOOD URINE, POC: NORMAL
COLOR, POC UA: NORMAL
GLUCOSE UR QL STRIP: NORMAL
KETONES UR QL STRIP: NORMAL
LEUKOCYTE ESTERASE URINE, POC: NORMAL
NITRITE, POC UA: NORMAL
PH, POC UA: 5
PROTEIN, POC: NORMAL
SPECIFIC GRAVITY, POC UA: 1000
UROBILINOGEN, POC UA: NORMAL

## 2018-04-13 PROCEDURE — 99024 POSTOP FOLLOW-UP VISIT: CPT | Mod: ,,, | Performed by: NURSE PRACTITIONER

## 2018-04-13 PROCEDURE — 81001 URINALYSIS AUTO W/SCOPE: CPT | Mod: PBBFAC | Performed by: NURSE PRACTITIONER

## 2018-04-13 PROCEDURE — 99213 OFFICE O/P EST LOW 20 MIN: CPT | Mod: PBBFAC | Performed by: NURSE PRACTITIONER

## 2018-04-13 PROCEDURE — 99999 PR PBB SHADOW E&M-EST. PATIENT-LVL III: CPT | Mod: PBBFAC,,, | Performed by: NURSE PRACTITIONER

## 2018-04-13 NOTE — PROGRESS NOTES
Subjective:       Patient ID: Jose Morales is a 58 y.o. male who is an established patient was seen for evaluation of left groin abscess  Chief Complaint:   Chief Complaint   Patient presents with    Follow-up     He was seen as in patient consultation on 4/5/18 for left groin cellulitis also with right forearm abscess.  He reports h/o recurrent abscess formation. Right forearm abscess noted x 1 week with drainage with manipulation prior to admission. Left groin swelling noted about 4 days prior to admission. No drainage from groin area. 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 Denies fevers.      He reports significant LUTS that he has not sought care. Nocturia x 3-4 with weak stream. He was started on Flomax during this hospital admission. No known fam hx of PCa. No prior PCa screening.      CT scan showed  no obvious fluid collection in L groin to midline around penis, edema noted in soft tissues. No air in area of infection. Bladder moderately distended, no hydronephrosis. Large prostate. He was noted to have more fluctuance around the area of the left groin wound near base of the penis on hospital day #1. Subsequently he had I&D of left groin/penile baseon 4/6/18 by Dr. Mccullough. Wound cx--+MRSA. He is currently taking Bactrim DS BID. He has been performing daily wound changes, wound packed with iodoform. He reports minimal drainage to packing.    He presents today for follow up. He denies pain, fever, chills, n/v or purulent drainage. He denies any issues with daily dressing changes. 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    ACTIVE MEDICAL ISSUES:  Patient Active Problem List   Diagnosis    Benign hypertensive heart disease without heart failure    Cellulitis of right arm    Cellulitis of groin    Hepatic steatosis    Bladder wall thickening    Essential hypertension    Abscess of right forearm    Tobacco abuse    Enlarged prostate    Chronic hepatitis C virus infection     Abscess of groin, left       ALLERGIES AND MEDICATIONS: updated and reviewed.  Review of patient's allergies indicates:  No Known Allergies  Current Outpatient Prescriptions   Medication Sig    ciprofloxacin HCl (CIPRO) 500 MG tablet Take 1 tablet (500 mg total) by mouth 2 (two) times daily.    clonazePAM (KLONOPIN) 2 MG Tab Take 2 mg by mouth 2 (two) times daily.    ibuprofen (ADVIL,MOTRIN) 800 MG tablet 1 tablet PO 2-3 times a day prn pain    lisinopril 10 MG tablet Take 1 tablet (10 mg total) by mouth once daily.    lisinopril 10 MG tablet Take 10 mg by mouth once daily.    ondansetron (ZOFRAN) 4 MG tablet Take 1 tablet (4 mg total) by mouth every 8 (eight) hours as needed.    SUBOXONE 8-2 mg Film 0.75 - 1 film SL BID    sulfamethoxazole-trimethoprim 800-160mg (BACTRIM DS) 800-160 mg Tab Take 1 tablet by mouth 2 (two) times daily.    tamsulosin (FLOMAX) 0.4 mg Cp24 Take 1 capsule (0.4 mg total) by mouth once daily.    trazodone (DESYREL) 50 MG tablet Take 1 tablet (50 mg total) by mouth every evening.     No current facility-administered medications for this visit.        Review of Systems   Constitutional: Negative for activity change, chills, fatigue, fever and unexpected weight change.   Eyes: Negative for discharge, redness and visual disturbance.   Respiratory: Negative for cough, shortness of breath and wheezing.    Cardiovascular: Negative for chest pain and leg swelling.   Gastrointestinal: Negative for abdominal distention, abdominal pain, constipation, diarrhea, nausea and vomiting.   Genitourinary: Negative for decreased urine volume, difficulty urinating, dysuria, flank pain, frequency, hematuria and urgency.        +left groin wound   Musculoskeletal: Negative for arthralgias, joint swelling and myalgias.   Skin: Positive for wound (right forearm/left groin wound). Negative for color change and rash.   Neurological: Negative for dizziness and light-headedness.   Psychiatric/Behavioral:  Negative for behavioral problems and confusion. The patient is not nervous/anxious.        Objective:      Vitals:    04/13/18 0934   Resp: 14   Weight: 91 kg (200 lb 9.6 oz)   Height: 6' (1.829 m)     Physical Exam   Nursing note and vitals reviewed.  Constitutional: He is oriented to person, place, and time. He appears well-developed.   HENT:   Head: Normocephalic and atraumatic.   Nose: Nose normal.   Eyes: Conjunctivae are normal. Right eye exhibits no discharge. Left eye exhibits no discharge.   Neck: Normal range of motion. Neck supple. No tracheal deviation present. No thyromegaly present.   Cardiovascular: Normal rate and regular rhythm.    Pulmonary/Chest: Effort normal. No stridor. No respiratory distress. He has no wheezes.   Abdominal: Soft. He exhibits no distension. There is no hepatosplenomegaly. There is no tenderness. There is no CVA tenderness. No hernia. Hernia confirmed negative in the right inguinal area and confirmed negative in the left inguinal area.   Genitourinary: Right testis shows no mass, no swelling and no tenderness. Left testis shows no mass, no swelling and no tenderness. Circumcised. No phimosis, penile erythema or penile tenderness. No discharge found.         Genitourinary Comments: 2.5cm x .5 cm incision noted to base of penis. Wound 1 cm in depth. Small amount of serosanguinous drainage noted to packing, no foul odor noted. No Erythema. No drainage noted to 4x4 covering site    Dressing change performed by patient this am   Musculoskeletal: Normal range of motion. He exhibits no edema.   Neurological: He is alert and oriented to person, place, and time.   Skin: Skin is warm and dry. No rash noted. No erythema.     Psychiatric: He has a normal mood and affect. His behavior is normal. Judgment normal.       Urine dipstick shows negative for all components.  Micro exam: negative for WBC's or RBC's.    Assessment:       1. Abscess of groin, left    2. Enlarged prostate    3.  Bladder wall thickening          Plan:       1. Abscess of groin, left  S/p I &D on 4/6/18  -Wound cx +MRSA--currently treated with Bactrim  -Wound healing appropriately  -Continue daily dressing changes  - POCT urinalysis, dipstick or tablet reag    2. Enlarged prostate  -Significant LUTS  -PSA 4/18--0.40  -Continue Flomax  -May need TURP in future    3. Bladder wall thickening  - Suspect 2/2 WEAVER due to enlarged prostate   - Continue Flomax       Follow-up in about 5 days (around 4/18/2018) for Follow up.

## 2018-05-29 ENCOUNTER — HOSPITAL ENCOUNTER (EMERGENCY)
Facility: HOSPITAL | Age: 58
Discharge: HOME | End: 2018-05-29
Attending: EMERGENCY MEDICINE

## 2018-05-29 VITALS
DIASTOLIC BLOOD PRESSURE: 71 MMHG | OXYGEN SATURATION: 97 % | BODY MASS INDEX: 28.31 KG/M2 | TEMPERATURE: 97 F | WEIGHT: 209 LBS | RESPIRATION RATE: 18 BRPM | HEART RATE: 66 BPM | HEIGHT: 72 IN | SYSTOLIC BLOOD PRESSURE: 119 MMHG

## 2018-05-29 DIAGNOSIS — R52 PAIN: ICD-10-CM

## 2018-05-29 DIAGNOSIS — M54.2 NECK PAIN: ICD-10-CM

## 2018-05-29 DIAGNOSIS — M25.512 ACUTE PAIN OF LEFT SHOULDER: Primary | ICD-10-CM
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
## 2018-05-29 DIAGNOSIS — M79.605 ACUTE LEG PAIN, LEFT: ICD-10-CM

## 2018-05-29 DIAGNOSIS — M51.36 DEGENERATIVE DISC DISEASE, LUMBAR: ICD-10-CM

## 2018-05-29 PROCEDURE — 99283 EMERGENCY DEPT VISIT LOW MDM: CPT | Mod: 25

## 2018-05-29 RX ORDER — DIAZEPAM 2 MG/1
2 TABLET ORAL EVERY 8 HOURS PRN
Qty: 6 TABLET | Refills: 0 | Status: SHIPPED | OUTPATIENT
Start: 2018-05-29 | End: 2018-07-11
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
## 2018-05-29 NOTE — ED TRIAGE NOTES
Wife reports that pt. Had a fall about 2 weeks ago while at work, pt. Hit his left shoulder and pt. Was unable to move neck and shoulder after fall. Pt. Reports that today he is still experiencing some pain to the back, neck and legs, but needs to get x-rays completed to give to company. Pt. Currently rating pain 10/10, AAOX4 calm in NAD, wife at bedside.

## 2021-04-01 ENCOUNTER — OFFICE VISIT (OUTPATIENT)
Dept: HEPATOLOGY | Facility: CLINIC | Age: 61
End: 2021-04-01
Payer: MEDICAID

## 2021-04-01 ENCOUNTER — LAB VISIT (OUTPATIENT)
Dept: LAB | Facility: HOSPITAL | Age: 61
End: 2021-04-01
Payer: MEDICAID

## 2021-04-01 VITALS
HEART RATE: 68 BPM | OXYGEN SATURATION: 98 % | RESPIRATION RATE: 20 BRPM | HEIGHT: 72 IN | SYSTOLIC BLOOD PRESSURE: 123 MMHG | TEMPERATURE: 97 F | DIASTOLIC BLOOD PRESSURE: 58 MMHG | BODY MASS INDEX: 23.89 KG/M2 | WEIGHT: 176.38 LBS

## 2021-04-01 DIAGNOSIS — K74.60 HEPATIC CIRRHOSIS, UNSPECIFIED HEPATIC CIRRHOSIS TYPE, UNSPECIFIED WHETHER ASCITES PRESENT: Primary | ICD-10-CM

## 2021-04-01 DIAGNOSIS — B18.2 CHRONIC HEPATITIS C WITHOUT HEPATIC COMA: ICD-10-CM

## 2021-04-01 DIAGNOSIS — R77.2 ELEVATED AFP: ICD-10-CM

## 2021-04-01 LAB
AFP SERPL-MCNC: 23 NG/ML (ref 0–8.4)
ALBUMIN SERPL BCP-MCNC: 3.6 G/DL (ref 3.5–5.2)
ALP SERPL-CCNC: 95 U/L (ref 55–135)
ALT SERPL W/O P-5'-P-CCNC: 72 U/L (ref 10–44)
ANION GAP SERPL CALC-SCNC: 5 MMOL/L (ref 8–16)
AST SERPL-CCNC: 105 U/L (ref 10–40)
BASOPHILS # BLD AUTO: 0.02 K/UL (ref 0–0.2)
BASOPHILS NFR BLD: 0.5 % (ref 0–1.9)
BILIRUB SERPL-MCNC: 0.9 MG/DL (ref 0.1–1)
BUN SERPL-MCNC: 9 MG/DL (ref 8–23)
CALCIUM SERPL-MCNC: 8.9 MG/DL (ref 8.7–10.5)
CHLORIDE SERPL-SCNC: 102 MMOL/L (ref 95–110)
CO2 SERPL-SCNC: 32 MMOL/L (ref 23–29)
CREAT SERPL-MCNC: 0.7 MG/DL (ref 0.5–1.4)
DIFFERENTIAL METHOD: ABNORMAL
EOSINOPHIL # BLD AUTO: 0.1 K/UL (ref 0–0.5)
EOSINOPHIL NFR BLD: 3.5 % (ref 0–8)
ERYTHROCYTE [DISTWIDTH] IN BLOOD BY AUTOMATED COUNT: 12.1 % (ref 11.5–14.5)
EST. GFR  (AFRICAN AMERICAN): >60 ML/MIN/1.73 M^2
EST. GFR  (NON AFRICAN AMERICAN): >60 ML/MIN/1.73 M^2
GLUCOSE SERPL-MCNC: 79 MG/DL (ref 70–110)
HBV CORE AB SERPL QL IA: NEGATIVE
HBV SURFACE AB SER-ACNC: NEGATIVE M[IU]/ML
HCT VFR BLD AUTO: 41.1 % (ref 40–54)
HEPATITIS A ANTIBODY, IGG: NEGATIVE
HGB BLD-MCNC: 13.7 G/DL (ref 14–18)
IMM GRANULOCYTES # BLD AUTO: 0 K/UL (ref 0–0.04)
IMM GRANULOCYTES NFR BLD AUTO: 0 % (ref 0–0.5)
INR PPP: 1.1 (ref 0.8–1.2)
LYMPHOCYTES # BLD AUTO: 1.4 K/UL (ref 1–4.8)
LYMPHOCYTES NFR BLD: 34.3 % (ref 18–48)
MCH RBC QN AUTO: 32.9 PG (ref 27–31)
MCHC RBC AUTO-ENTMCNC: 33.3 G/DL (ref 32–36)
MCV RBC AUTO: 99 FL (ref 82–98)
MONOCYTES # BLD AUTO: 0.6 K/UL (ref 0.3–1)
MONOCYTES NFR BLD: 14.9 % (ref 4–15)
NEUTROPHILS # BLD AUTO: 1.9 K/UL (ref 1.8–7.7)
NEUTROPHILS NFR BLD: 46.8 % (ref 38–73)
NRBC BLD-RTO: 0 /100 WBC
PLATELET # BLD AUTO: 143 K/UL (ref 150–450)
PMV BLD AUTO: 10.7 FL (ref 9.2–12.9)
POTASSIUM SERPL-SCNC: 4.4 MMOL/L (ref 3.5–5.1)
PROT SERPL-MCNC: 7.2 G/DL (ref 6–8.4)
PROTHROMBIN TIME: 11.9 SEC (ref 9–12.5)
RBC # BLD AUTO: 4.16 M/UL (ref 4.6–6.2)
SODIUM SERPL-SCNC: 139 MMOL/L (ref 136–145)
WBC # BLD AUTO: 3.97 K/UL (ref 3.9–12.7)

## 2021-04-01 PROCEDURE — 99999 PR PBB SHADOW E&M-EST. PATIENT-LVL IV: CPT | Mod: PBBFAC,,, | Performed by: PHYSICIAN ASSISTANT

## 2021-04-01 PROCEDURE — 99204 PR OFFICE/OUTPT VISIT, NEW, LEVL IV, 45-59 MIN: ICD-10-PCS | Mod: S$PBB,,, | Performed by: PHYSICIAN ASSISTANT

## 2021-04-01 PROCEDURE — 85610 PROTHROMBIN TIME: CPT | Performed by: PHYSICIAN ASSISTANT

## 2021-04-01 PROCEDURE — 99204 OFFICE O/P NEW MOD 45 MIN: CPT | Mod: S$PBB,,, | Performed by: PHYSICIAN ASSISTANT

## 2021-04-01 PROCEDURE — 80053 COMPREHEN METABOLIC PANEL: CPT | Performed by: PHYSICIAN ASSISTANT

## 2021-04-01 PROCEDURE — 86706 HEP B SURFACE ANTIBODY: CPT | Performed by: PHYSICIAN ASSISTANT

## 2021-04-01 PROCEDURE — 80321 ALCOHOLS BIOMARKERS 1OR 2: CPT | Performed by: PHYSICIAN ASSISTANT

## 2021-04-01 PROCEDURE — 36415 COLL VENOUS BLD VENIPUNCTURE: CPT | Performed by: PHYSICIAN ASSISTANT

## 2021-04-01 PROCEDURE — 85025 COMPLETE CBC W/AUTO DIFF WBC: CPT | Performed by: PHYSICIAN ASSISTANT

## 2021-04-01 PROCEDURE — 86704 HEP B CORE ANTIBODY TOTAL: CPT | Performed by: PHYSICIAN ASSISTANT

## 2021-04-01 PROCEDURE — 99214 OFFICE O/P EST MOD 30 MIN: CPT | Mod: PBBFAC | Performed by: PHYSICIAN ASSISTANT

## 2021-04-01 PROCEDURE — 86790 VIRUS ANTIBODY NOS: CPT | Performed by: PHYSICIAN ASSISTANT

## 2021-04-01 PROCEDURE — 82105 ALPHA-FETOPROTEIN SERUM: CPT | Performed by: PHYSICIAN ASSISTANT

## 2021-04-01 PROCEDURE — 99999 PR PBB SHADOW E&M-EST. PATIENT-LVL IV: ICD-10-PCS | Mod: PBBFAC,,, | Performed by: PHYSICIAN ASSISTANT

## 2021-04-05 ENCOUNTER — TELEPHONE (OUTPATIENT)
Dept: HEPATOLOGY | Facility: CLINIC | Age: 61
End: 2021-04-05

## 2021-04-05 DIAGNOSIS — D64.9 ANEMIA, UNSPECIFIED TYPE: Primary | ICD-10-CM

## 2021-04-05 LAB — PHOSPHATIDYLETHANOL (PETH): NEGATIVE NG/ML

## 2021-04-28 ENCOUNTER — TELEPHONE (OUTPATIENT)
Dept: HEPATOLOGY | Facility: CLINIC | Age: 61
End: 2021-04-28

## 2021-05-01 ENCOUNTER — HOSPITAL ENCOUNTER (EMERGENCY)
Facility: HOSPITAL | Age: 61
Discharge: HOME | End: 2021-05-01
Attending: EMERGENCY MEDICINE
Payer: MEDICAID

## 2021-05-01 VITALS
SYSTOLIC BLOOD PRESSURE: 122 MMHG | OXYGEN SATURATION: 99 % | WEIGHT: 170 LBS | DIASTOLIC BLOOD PRESSURE: 71 MMHG | HEIGHT: 72 IN | HEART RATE: 67 BPM | RESPIRATION RATE: 18 BRPM | TEMPERATURE: 98 F | BODY MASS INDEX: 23.03 KG/M2

## 2021-05-01 DIAGNOSIS — T40.1X1A ACCIDENTAL OVERDOSE OF HEROIN, INITIAL ENCOUNTER: Primary | ICD-10-CM

## 2021-05-01 PROCEDURE — 99283 EMERGENCY DEPT VISIT LOW MDM: CPT | Mod: 25

## 2021-05-01 RX ORDER — NALOXONE HYDROCHLORIDE 4 MG/.1ML
SPRAY NASAL
Qty: 1 EACH | Refills: 11 | Status: SHIPPED | OUTPATIENT
Start: 2021-05-01

## 2021-05-01 RX ORDER — NALOXONE HYDROCHLORIDE 4 MG/.1ML
SPRAY NASAL
Qty: 1 EACH | Refills: 11 | Status: SHIPPED | OUTPATIENT
Start: 2021-05-01 | End: 2021-05-01 | Stop reason: SDUPTHER

## 2021-05-07 ENCOUNTER — TELEPHONE (OUTPATIENT)
Dept: HEPATOLOGY | Facility: CLINIC | Age: 61
End: 2021-05-07

## 2021-05-07 ENCOUNTER — PROCEDURE VISIT (OUTPATIENT)
Dept: HEPATOLOGY | Facility: CLINIC | Age: 61
End: 2021-05-07
Payer: MEDICAID

## 2021-05-07 DIAGNOSIS — B18.2 CHRONIC HEPATITIS C WITHOUT HEPATIC COMA: ICD-10-CM

## 2021-05-07 DIAGNOSIS — R77.2 ELEVATED AFP: ICD-10-CM

## 2021-05-07 PROCEDURE — 91200 LIVER ELASTOGRAPHY: CPT | Mod: 26,S$PBB,, | Performed by: PHYSICIAN ASSISTANT

## 2021-05-07 PROCEDURE — 91200 LIVER ELASTOGRAPHY: CPT | Mod: PBBFAC | Performed by: PHYSICIAN ASSISTANT

## 2021-05-07 PROCEDURE — 91200 FIBROSCAN (VIBRATION CONTROLLED TRANSIENT ELASTOGRAPHY): ICD-10-PCS | Mod: 26,S$PBB,, | Performed by: PHYSICIAN ASSISTANT

## 2021-07-01 ENCOUNTER — LAB VISIT (OUTPATIENT)
Dept: LAB | Facility: HOSPITAL | Age: 61
End: 2021-07-01
Payer: MEDICAID

## 2021-07-01 ENCOUNTER — OFFICE VISIT (OUTPATIENT)
Dept: HEPATOLOGY | Facility: CLINIC | Age: 61
End: 2021-07-01
Payer: MEDICAID

## 2021-07-01 VITALS
OXYGEN SATURATION: 94 % | WEIGHT: 178.56 LBS | HEIGHT: 72 IN | TEMPERATURE: 98 F | BODY MASS INDEX: 24.18 KG/M2 | DIASTOLIC BLOOD PRESSURE: 59 MMHG | RESPIRATION RATE: 12 BRPM | HEART RATE: 55 BPM | SYSTOLIC BLOOD PRESSURE: 107 MMHG

## 2021-07-01 DIAGNOSIS — D64.9 ANEMIA, UNSPECIFIED TYPE: ICD-10-CM

## 2021-07-01 DIAGNOSIS — K74.60 HEPATIC CIRRHOSIS, UNSPECIFIED HEPATIC CIRRHOSIS TYPE, UNSPECIFIED WHETHER ASCITES PRESENT: Primary | ICD-10-CM

## 2021-07-01 DIAGNOSIS — K74.60 HEPATIC CIRRHOSIS, UNSPECIFIED HEPATIC CIRRHOSIS TYPE, UNSPECIFIED WHETHER ASCITES PRESENT: ICD-10-CM

## 2021-07-01 DIAGNOSIS — B18.2 CHRONIC HEPATITIS C WITHOUT HEPATIC COMA: ICD-10-CM

## 2021-07-01 LAB
BASOPHILS # BLD AUTO: 0.02 K/UL (ref 0–0.2)
BASOPHILS NFR BLD: 0.6 % (ref 0–1.9)
DIFFERENTIAL METHOD: ABNORMAL
EOSINOPHIL # BLD AUTO: 0.2 K/UL (ref 0–0.5)
EOSINOPHIL NFR BLD: 6.1 % (ref 0–8)
ERYTHROCYTE [DISTWIDTH] IN BLOOD BY AUTOMATED COUNT: 12.7 % (ref 11.5–14.5)
FERRITIN SERPL-MCNC: 174 NG/ML (ref 20–300)
FOLATE SERPL-MCNC: 11.3 NG/ML (ref 4–24)
HCT VFR BLD AUTO: 40.5 % (ref 40–54)
HGB BLD-MCNC: 13.4 G/DL (ref 14–18)
IMM GRANULOCYTES # BLD AUTO: 0.01 K/UL (ref 0–0.04)
IMM GRANULOCYTES NFR BLD AUTO: 0.3 % (ref 0–0.5)
IRON SERPL-MCNC: 144 UG/DL (ref 45–160)
LYMPHOCYTES # BLD AUTO: 1.6 K/UL (ref 1–4.8)
LYMPHOCYTES NFR BLD: 47.1 % (ref 18–48)
MCH RBC QN AUTO: 33.6 PG (ref 27–31)
MCHC RBC AUTO-ENTMCNC: 33.1 G/DL (ref 32–36)
MCV RBC AUTO: 102 FL (ref 82–98)
MONOCYTES # BLD AUTO: 0.4 K/UL (ref 0.3–1)
MONOCYTES NFR BLD: 10.7 % (ref 4–15)
NEUTROPHILS # BLD AUTO: 1.2 K/UL (ref 1.8–7.7)
NEUTROPHILS NFR BLD: 35.2 % (ref 38–73)
NRBC BLD-RTO: 0 /100 WBC
PLATELET # BLD AUTO: 114 K/UL (ref 150–450)
PMV BLD AUTO: 11.6 FL (ref 9.2–12.9)
RBC # BLD AUTO: 3.99 M/UL (ref 4.6–6.2)
SATURATED IRON: 46 % (ref 20–50)
TOTAL IRON BINDING CAPACITY: 314 UG/DL (ref 250–450)
TRANSFERRIN SERPL-MCNC: 212 MG/DL (ref 200–375)
VIT B12 SERPL-MCNC: 475 PG/ML (ref 210–950)
WBC # BLD AUTO: 3.46 K/UL (ref 3.9–12.7)

## 2021-07-01 PROCEDURE — 85025 COMPLETE CBC W/AUTO DIFF WBC: CPT | Performed by: PHYSICIAN ASSISTANT

## 2021-07-01 PROCEDURE — 99214 OFFICE O/P EST MOD 30 MIN: CPT | Mod: PBBFAC | Performed by: PHYSICIAN ASSISTANT

## 2021-07-01 PROCEDURE — 82607 VITAMIN B-12: CPT | Performed by: PHYSICIAN ASSISTANT

## 2021-07-01 PROCEDURE — 83540 ASSAY OF IRON: CPT | Performed by: PHYSICIAN ASSISTANT

## 2021-07-01 PROCEDURE — 99215 OFFICE O/P EST HI 40 MIN: CPT | Mod: S$PBB,,, | Performed by: PHYSICIAN ASSISTANT

## 2021-07-01 PROCEDURE — 82728 ASSAY OF FERRITIN: CPT | Performed by: PHYSICIAN ASSISTANT

## 2021-07-01 PROCEDURE — 99999 PR PBB SHADOW E&M-EST. PATIENT-LVL IV: CPT | Mod: PBBFAC,,, | Performed by: PHYSICIAN ASSISTANT

## 2021-07-01 PROCEDURE — 99999 PR PBB SHADOW E&M-EST. PATIENT-LVL IV: ICD-10-PCS | Mod: PBBFAC,,, | Performed by: PHYSICIAN ASSISTANT

## 2021-07-01 PROCEDURE — 82746 ASSAY OF FOLIC ACID SERUM: CPT | Performed by: PHYSICIAN ASSISTANT

## 2021-07-01 PROCEDURE — 36415 COLL VENOUS BLD VENIPUNCTURE: CPT | Performed by: PHYSICIAN ASSISTANT

## 2021-07-01 PROCEDURE — 99215 PR OFFICE/OUTPT VISIT, EST, LEVL V, 40-54 MIN: ICD-10-PCS | Mod: S$PBB,,, | Performed by: PHYSICIAN ASSISTANT

## 2021-07-12 ENCOUNTER — TELEPHONE (OUTPATIENT)
Dept: HEPATOLOGY | Facility: CLINIC | Age: 61
End: 2021-07-12

## 2021-07-12 DIAGNOSIS — B18.2 CHRONIC HEPATITIS C WITHOUT HEPATIC COMA: Primary | ICD-10-CM

## 2021-07-26 ENCOUNTER — TELEPHONE (OUTPATIENT)
Dept: HEPATOLOGY | Facility: CLINIC | Age: 61
End: 2021-07-26

## 2022-02-04 ENCOUNTER — TELEPHONE (OUTPATIENT)
Dept: HEPATOLOGY | Facility: CLINIC | Age: 62
End: 2022-02-04
Payer: MEDICAID

## 2022-02-04 ENCOUNTER — OFFICE VISIT (OUTPATIENT)
Dept: HEPATOLOGY | Facility: CLINIC | Age: 62
End: 2022-02-04
Payer: MEDICAID

## 2022-02-04 ENCOUNTER — LAB VISIT (OUTPATIENT)
Dept: LAB | Facility: HOSPITAL | Age: 62
End: 2022-02-04
Payer: MEDICAID

## 2022-02-04 VITALS
WEIGHT: 168.88 LBS | HEIGHT: 72 IN | SYSTOLIC BLOOD PRESSURE: 170 MMHG | BODY MASS INDEX: 22.87 KG/M2 | DIASTOLIC BLOOD PRESSURE: 70 MMHG | OXYGEN SATURATION: 97 % | HEART RATE: 62 BPM | RESPIRATION RATE: 18 BRPM | TEMPERATURE: 97 F

## 2022-02-04 DIAGNOSIS — D69.6 THROMBOCYTOPENIA: ICD-10-CM

## 2022-02-04 DIAGNOSIS — K74.60 HEPATIC CIRRHOSIS, UNSPECIFIED HEPATIC CIRRHOSIS TYPE, UNSPECIFIED WHETHER ASCITES PRESENT: Primary | ICD-10-CM

## 2022-02-04 DIAGNOSIS — K74.69 OTHER CIRRHOSIS OF LIVER: ICD-10-CM

## 2022-02-04 DIAGNOSIS — B18.2 CHRONIC HEPATITIS C WITHOUT HEPATIC COMA: ICD-10-CM

## 2022-02-04 DIAGNOSIS — K74.60 HEPATIC CIRRHOSIS, UNSPECIFIED HEPATIC CIRRHOSIS TYPE, UNSPECIFIED WHETHER ASCITES PRESENT: ICD-10-CM

## 2022-02-04 LAB
AFP SERPL-MCNC: 31 NG/ML (ref 0–8.4)
ALBUMIN SERPL BCP-MCNC: 3.7 G/DL (ref 3.5–5.2)
ALP SERPL-CCNC: 79 U/L (ref 55–135)
ALT SERPL W/O P-5'-P-CCNC: 91 U/L (ref 10–44)
ANION GAP SERPL CALC-SCNC: 8 MMOL/L (ref 8–16)
AST SERPL-CCNC: 112 U/L (ref 10–40)
BASOPHILS # BLD AUTO: 0.01 K/UL (ref 0–0.2)
BASOPHILS NFR BLD: 0.3 % (ref 0–1.9)
BILIRUB SERPL-MCNC: 1.2 MG/DL (ref 0.1–1)
BUN SERPL-MCNC: 9 MG/DL (ref 8–23)
CALCIUM SERPL-MCNC: 10 MG/DL (ref 8.7–10.5)
CHLORIDE SERPL-SCNC: 101 MMOL/L (ref 95–110)
CO2 SERPL-SCNC: 29 MMOL/L (ref 23–29)
CREAT SERPL-MCNC: 0.7 MG/DL (ref 0.5–1.4)
DIFFERENTIAL METHOD: ABNORMAL
EOSINOPHIL # BLD AUTO: 0 K/UL (ref 0–0.5)
EOSINOPHIL NFR BLD: 0.6 % (ref 0–8)
ERYTHROCYTE [DISTWIDTH] IN BLOOD BY AUTOMATED COUNT: 12.4 % (ref 11.5–14.5)
EST. GFR  (AFRICAN AMERICAN): >60 ML/MIN/1.73 M^2
EST. GFR  (NON AFRICAN AMERICAN): >60 ML/MIN/1.73 M^2
GLUCOSE SERPL-MCNC: 96 MG/DL (ref 70–110)
HCT VFR BLD AUTO: 41.8 % (ref 40–54)
HGB BLD-MCNC: 13.9 G/DL (ref 14–18)
IMM GRANULOCYTES # BLD AUTO: 0 K/UL (ref 0–0.04)
IMM GRANULOCYTES NFR BLD AUTO: 0 % (ref 0–0.5)
INR PPP: 1.1 (ref 0.8–1.2)
LYMPHOCYTES # BLD AUTO: 0.8 K/UL (ref 1–4.8)
LYMPHOCYTES NFR BLD: 24.8 % (ref 18–48)
MCH RBC QN AUTO: 33.3 PG (ref 27–31)
MCHC RBC AUTO-ENTMCNC: 33.3 G/DL (ref 32–36)
MCV RBC AUTO: 100 FL (ref 82–98)
MONOCYTES # BLD AUTO: 0.4 K/UL (ref 0.3–1)
MONOCYTES NFR BLD: 11.6 % (ref 4–15)
NEUTROPHILS # BLD AUTO: 2 K/UL (ref 1.8–7.7)
NEUTROPHILS NFR BLD: 62.7 % (ref 38–73)
NRBC BLD-RTO: 0 /100 WBC
PLATELET # BLD AUTO: 114 K/UL (ref 150–450)
PMV BLD AUTO: 11.3 FL (ref 9.2–12.9)
POTASSIUM SERPL-SCNC: 4.2 MMOL/L (ref 3.5–5.1)
PROT SERPL-MCNC: 7.5 G/DL (ref 6–8.4)
PROTHROMBIN TIME: 11.8 SEC (ref 9–12.5)
RBC # BLD AUTO: 4.17 M/UL (ref 4.6–6.2)
SODIUM SERPL-SCNC: 138 MMOL/L (ref 136–145)
WBC # BLD AUTO: 3.18 K/UL (ref 3.9–12.7)

## 2022-02-04 PROCEDURE — 36415 COLL VENOUS BLD VENIPUNCTURE: CPT | Performed by: PHYSICIAN ASSISTANT

## 2022-02-04 PROCEDURE — 85610 PROTHROMBIN TIME: CPT | Performed by: PHYSICIAN ASSISTANT

## 2022-02-04 PROCEDURE — 99214 PR OFFICE/OUTPT VISIT, EST, LEVL IV, 30-39 MIN: ICD-10-PCS | Mod: S$PBB,,, | Performed by: PHYSICIAN ASSISTANT

## 2022-02-04 PROCEDURE — 99214 OFFICE O/P EST MOD 30 MIN: CPT | Mod: S$PBB,,, | Performed by: PHYSICIAN ASSISTANT

## 2022-02-04 PROCEDURE — 3008F PR BODY MASS INDEX (BMI) DOCUMENTED: ICD-10-PCS | Mod: CPTII,,, | Performed by: PHYSICIAN ASSISTANT

## 2022-02-04 PROCEDURE — 80053 COMPREHEN METABOLIC PANEL: CPT | Performed by: PHYSICIAN ASSISTANT

## 2022-02-04 PROCEDURE — 99999 PR PBB SHADOW E&M-EST. PATIENT-LVL IV: ICD-10-PCS | Mod: PBBFAC,,, | Performed by: PHYSICIAN ASSISTANT

## 2022-02-04 PROCEDURE — 3078F DIAST BP <80 MM HG: CPT | Mod: CPTII,,, | Performed by: PHYSICIAN ASSISTANT

## 2022-02-04 PROCEDURE — 3077F SYST BP >= 140 MM HG: CPT | Mod: CPTII,,, | Performed by: PHYSICIAN ASSISTANT

## 2022-02-04 PROCEDURE — 1159F MED LIST DOCD IN RCRD: CPT | Mod: CPTII,,, | Performed by: PHYSICIAN ASSISTANT

## 2022-02-04 PROCEDURE — 85025 COMPLETE CBC W/AUTO DIFF WBC: CPT | Performed by: PHYSICIAN ASSISTANT

## 2022-02-04 PROCEDURE — 99214 OFFICE O/P EST MOD 30 MIN: CPT | Mod: PBBFAC | Performed by: PHYSICIAN ASSISTANT

## 2022-02-04 PROCEDURE — 82105 ALPHA-FETOPROTEIN SERUM: CPT | Performed by: PHYSICIAN ASSISTANT

## 2022-02-04 PROCEDURE — 99999 PR PBB SHADOW E&M-EST. PATIENT-LVL IV: CPT | Mod: PBBFAC,,, | Performed by: PHYSICIAN ASSISTANT

## 2022-02-04 PROCEDURE — 3008F BODY MASS INDEX DOCD: CPT | Mod: CPTII,,, | Performed by: PHYSICIAN ASSISTANT

## 2022-02-04 PROCEDURE — 3078F PR MOST RECENT DIASTOLIC BLOOD PRESSURE < 80 MM HG: ICD-10-PCS | Mod: CPTII,,, | Performed by: PHYSICIAN ASSISTANT

## 2022-02-04 PROCEDURE — 3077F PR MOST RECENT SYSTOLIC BLOOD PRESSURE >= 140 MM HG: ICD-10-PCS | Mod: CPTII,,, | Performed by: PHYSICIAN ASSISTANT

## 2022-02-04 PROCEDURE — 1159F PR MEDICATION LIST DOCUMENTED IN MEDICAL RECORD: ICD-10-PCS | Mod: CPTII,,, | Performed by: PHYSICIAN ASSISTANT

## 2022-02-04 NOTE — PATIENT INSTRUCTIONS
CIRRHOSIS   You have cirrhosis - which means there is scarring all throughout your liver.  Your current tests show your liver is working well, but you now need monitoring of your liver every 6 months forever.  Our goal is to get rid of everything that has damaged your liver to prevent further scarring. We want to protect the liver!    MELD score (liver sickness score)  6 = normal (better chance of survival with your own liver)  15 = better chance of survival with a liver transplant  --> Your MELD score was 7 (4/2021 - need to update this now)    Liver cancer screening (blood test and ultrasound) is needed every 6 months forever.  --> Your tumor marker blood test is elevated. We need to repeat it and you need a picture of your liver.    EGD (upper scope) to check for varices (swollen vessels) is needed periodically.  --> This is needed now. I'll investigate if it can be done on [employer].    GENERAL RECOMMENDATIONS   AVOID non-steroidal anti-inflammatory drugs (NSAIDs) such as ibuprofen (Motrin, Advil), naproxen (Naprosyn, Aleve)    Tylenol/acetaminophen is OKAY for pain, no more than 2000 mg per day    DIET RECOMMENDATIONS  · AVOID ALL alcohol (includes beer, wine and liquor)   NO RAW SEAFOOD due to the risk of infection   LOW SODIUM / SALT diet, less than 2000 mg per day   (Avoid canned foods, avoid adding salt to food, use salt-free seasonings when cooking. Read food labels)   HIGH PROTEIN DIET to prevent muscle mass loss   (ie: meat, chicken, fish, eggs, dairy, whey protein powder, protein shakes).   · Avoid missing & skipping missing meals.   (Smaller more frequent meals throughout the day is better than fewer large meals.)    PLAN:  1. Labs today. I will set up either CT or Ultrasound after seeing these results.  2. Follow up visit so we can review results and get started with HCV treatment.  3. You need the vaccine for Hepatitis A and B (Twinrix)  - 1st shot --> 2nd shot at 4 weeks --> 3rd shot at 6  months  - Call to schedule in injection clinic: [phone]

## 2022-02-04 NOTE — TELEPHONE ENCOUNTER
Labs reviewed:    MELD-Na score: 8 at 2/4/2022 11:44 AM  MELD score: 8 at 2/4/2022 11:44 AM  Calculated from:  Serum Creatinine: 0.7 mg/dL (Using min of 1 mg/dL) at 2/4/2022 11:44 AM  Serum Sodium: 138 mmol/L (Using max of 137 mmol/L) at 2/4/2022 11:44 AM  Total Bilirubin: 1.2 mg/dL at 2/4/2022 11:44 AM  INR(ratio): 1.1 at 2/4/2022 11:44 AM  Age: 62 years      pls tell pt   1. AFP tumor marker went up a little higher from 20s to 30s  I recommend he have CT instead of U/S  2. MELD (liver sickness score) is stable overall at 8    Schedule tpCT    thanks
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
## 2022-02-07 NOTE — TELEPHONE ENCOUNTER
Attempt made to reach patient on 2/4 and today.  Msg from PA Scheuermann let on his VM and mailed to him.  I stressed that he call for meli.

## 2022-02-07 NOTE — TELEPHONE ENCOUNTER
I spoke with patient and msg from PA Scheuermann relayed.  CT scheduled 2/11/22; appt reminder notice mailed.

## 2022-03-17 ENCOUNTER — TELEPHONE (OUTPATIENT)
Dept: HEPATOLOGY | Facility: CLINIC | Age: 62
End: 2022-03-17
Payer: MEDICAID

## 2022-03-17 DIAGNOSIS — K74.60 HEPATIC CIRRHOSIS, UNSPECIFIED HEPATIC CIRRHOSIS TYPE, UNSPECIFIED WHETHER ASCITES PRESENT: Primary | ICD-10-CM

## 2022-03-17 DIAGNOSIS — R77.2 ELEVATED AFP: ICD-10-CM

## 2022-03-17 NOTE — TELEPHONE ENCOUNTER
Last seen 2/4/22  AFP rising. tpCT ordered and scheduled but not authorized and apparently cancelled.    Needs to be r/s so auth can be obtained. Will need to do peer to peer if denied again  appt today will be r/s until after CT        pls schedule tpCT

## 2022-03-23 ENCOUNTER — TELEPHONE (OUTPATIENT)
Dept: HEPATOLOGY | Facility: CLINIC | Age: 62
End: 2022-03-23
Payer: MEDICAID

## 2022-03-23 DIAGNOSIS — Z01.818 PRE-PROCEDURAL EXAMINATION: Primary | ICD-10-CM

## 2022-03-23 DIAGNOSIS — B18.2 CHRONIC HEPATITIS C WITHOUT HEPATIC COMA: Primary | ICD-10-CM

## 2022-03-23 NOTE — TELEPHONE ENCOUNTER
----- Message from Teri Burton sent at 3/23/2022  7:11 AM CDT -----  Regarding: Lab order for CT  Hello,          This patient is scheduled for a ct. The patient will need a cmp, bmp, or creatinine lab prior to receiving contrast. Please put in the lab order as soon as possible to avoid any delay in patient care.        Thank you,    Teri Burton

## 2022-03-24 ENCOUNTER — TELEPHONE (OUTPATIENT)
Dept: HEPATOLOGY | Facility: CLINIC | Age: 62
End: 2022-03-24
Payer: MEDICAID

## 2022-03-24 ENCOUNTER — HOSPITAL ENCOUNTER (OUTPATIENT)
Dept: RADIOLOGY | Facility: HOSPITAL | Age: 62
Discharge: HOME | End: 2022-03-24
Attending: PHYSICIAN ASSISTANT
Payer: MEDICAID

## 2022-03-24 DIAGNOSIS — R93.89 ABNORMAL FINDING ON IMAGING: Primary | ICD-10-CM

## 2022-03-24 DIAGNOSIS — R77.2 ELEVATED AFP: ICD-10-CM

## 2022-03-24 DIAGNOSIS — K74.60 HEPATIC CIRRHOSIS, UNSPECIFIED HEPATIC CIRRHOSIS TYPE, UNSPECIFIED WHETHER ASCITES PRESENT: ICD-10-CM

## 2022-03-24 PROCEDURE — 25500020 PHARM REV CODE 255: Performed by: PHYSICIAN ASSISTANT

## 2022-03-24 PROCEDURE — 74170 CT ABD WO CNTRST FLWD CNTRST: CPT | Mod: TC

## 2022-03-24 PROCEDURE — 74170 CT ABDOMEN W WO CONTRAST: ICD-10-PCS | Mod: 26,,, | Performed by: RADIOLOGY

## 2022-03-24 PROCEDURE — 74170 CT ABD WO CNTRST FLWD CNTRST: CPT | Mod: 26,,, | Performed by: RADIOLOGY

## 2022-03-24 RX ADMIN — IOHEXOL 85 ML: 350 INJECTION, SOLUTION INTRAVENOUS at 01:03

## 2022-03-24 NOTE — TELEPHONE ENCOUNTER
CT 3/24/22 - done due to elevated AFP    No liver lesions  Cystic lesion arising from pelvis, needs further eval    Tried to call pt - left  to call back    When pt calls please tell him:  1. CT scan was done to check for spots or tumors on his liver. None were seen. This is good.  2. CT did show what may be a cyst in his pelvis. Technically the scan he just had only looked at the abdomen and did not show the entire pelvis so he now needs another CT scan to evaluate the pelvis and this possible cyst.    Please schedule CT abd /pelvis

## 2022-03-25 ENCOUNTER — TELEPHONE (OUTPATIENT)
Dept: HEPATOLOGY | Facility: CLINIC | Age: 62
End: 2022-03-25
Payer: MEDICAID

## 2022-03-25 NOTE — TELEPHONE ENCOUNTER
----- Message from Ankush Narayan sent at 3/25/2022 12:36 PM CDT -----  Pt states he needs to reschedule u/s for a later date for insurance purposes    Call #[phone]

## 2022-04-18 ENCOUNTER — TELEPHONE (OUTPATIENT)
Dept: HEPATOLOGY | Facility: CLINIC | Age: 62
End: 2022-04-18
Payer: MEDICAID

## 2022-04-18 NOTE — TELEPHONE ENCOUNTER
"----- Message from Channing García sent at 4/18/2022  2:57 PM CDT -----  Regarding: Returning Phone Call  Contact: Jose  Consult/Advisory:           Name Of Caller: Jose  Contact Preference?: [phone]              What is the nature of the call?:    Pt is checking to see if missed call he received was from this office.      Additional Notes:  "Thank you for all that you do for our patients'"     "

## 2022-04-18 NOTE — TELEPHONE ENCOUNTER
Not sure who called patient but I spoke with patient and reminded him of upcoming appts and instructions.

## 2022-04-22 ENCOUNTER — TELEPHONE (OUTPATIENT)
Dept: HEPATOLOGY | Facility: CLINIC | Age: 62
End: 2022-04-22
Payer: MEDICAID

## 2022-04-22 DIAGNOSIS — K74.60 HEPATIC CIRRHOSIS, UNSPECIFIED HEPATIC CIRRHOSIS TYPE, UNSPECIFIED WHETHER ASCITES PRESENT: Primary | ICD-10-CM

## 2022-04-22 NOTE — TELEPHONE ENCOUNTER
----- Message from Teri Burton sent at 4/22/2022  2:16 PM CDT -----  Regarding: Lab order for CT  [10/21/2021 11:20 AM] Torie Johnson,  This patient is scheduled for a ct. The patient will need a cmp, bmp, or creatinine lab prior to receiving contrast. Please put in the lab order as soon as possible to avoid any delay in patient care. Thank you,Teri Burton    [10/21/2021 12:20 PM] Teri Burton  Did you speak to Ms Domenico, auth still pending

## 2022-04-25 ENCOUNTER — TELEPHONE (OUTPATIENT)
Dept: HEPATOLOGY | Facility: CLINIC | Age: 62
End: 2022-04-25
Payer: MEDICAID

## 2022-04-25 ENCOUNTER — HOSPITAL ENCOUNTER (OUTPATIENT)
Dept: RADIOLOGY | Facility: HOSPITAL | Age: 62
Discharge: HOME | End: 2022-04-25
Attending: PHYSICIAN ASSISTANT
Payer: MEDICAID

## 2022-04-25 DIAGNOSIS — R93.89 ABNORMAL FINDING ON IMAGING: ICD-10-CM

## 2022-04-25 DIAGNOSIS — N32.0 BLADDER OUTLET OBSTRUCTION: Primary | ICD-10-CM

## 2022-04-25 PROCEDURE — 74177 CT ABD & PELVIS W/CONTRAST: CPT | Mod: TC

## 2022-04-25 PROCEDURE — 74177 CT ABD & PELVIS W/CONTRAST: CPT | Mod: 26,,, | Performed by: RADIOLOGY

## 2022-04-25 PROCEDURE — 74177 CT ABDOMEN PELVIS WITH CONTRAST: ICD-10-PCS | Mod: 26,,, | Performed by: RADIOLOGY

## 2022-04-25 PROCEDURE — 25500020 PHARM REV CODE 255: Performed by: PHYSICIAN ASSISTANT

## 2022-04-25 RX ADMIN — IOHEXOL 75 ML: 350 INJECTION, SOLUTION INTRAVENOUS at 02:04

## 2022-04-25 NOTE — TELEPHONE ENCOUNTER
CT abd/pelvis 4/25/22 -  There is marked distension of the urinary bladder which extends above the level of the umbilicus.  There are multiple small diverticula and sacculation is/trabeculation of the wall and the prostate gland protrudes into the base of the urinary bladder.    pls call pt:  1. CT scan from today shows his bladder is very enlarged and it appears he is not emptying his bladder well, probably related to his prostate. He needs to see urology for this    Referral entered  pls schedule    2. Keep upcoming appt w/ me for HCV treatment.

## 2022-04-26 ENCOUNTER — TELEPHONE (OUTPATIENT)
Dept: HEPATOLOGY | Facility: CLINIC | Age: 62
End: 2022-04-26
Payer: MEDICAID

## 2022-04-26 NOTE — TELEPHONE ENCOUNTER
----- Message from Tereso Pacheco sent at 4/26/2022  8:31 AM CDT -----  Type:  Patient Returning Call    Who Called:Patient     Who Left Message for Patient:Jennifer B. Scheuermann, PA    Does the patient know what this is regarding?:yes     Would the patient rather a call back or a response via [facility]? Call back     Best Call Back Number:[phone] ([place])     Additional Information:

## 2022-04-26 NOTE — TELEPHONE ENCOUNTER
Attempt made to reach patient X 2.  Message from PA Scheuermann left on his VM and mailed to him.  I stressed that he call urology for scheduling.

## 2022-04-28 ENCOUNTER — OFFICE VISIT (OUTPATIENT)
Dept: HEPATOLOGY | Facility: CLINIC | Age: 62
End: 2022-04-28
Payer: MEDICAID

## 2022-04-28 VITALS
BODY MASS INDEX: 23.44 KG/M2 | RESPIRATION RATE: 18 BRPM | DIASTOLIC BLOOD PRESSURE: 61 MMHG | HEIGHT: 72 IN | WEIGHT: 173.06 LBS | SYSTOLIC BLOOD PRESSURE: 125 MMHG | OXYGEN SATURATION: 97 % | TEMPERATURE: 98 F | HEART RATE: 63 BPM

## 2022-04-28 DIAGNOSIS — R74.8 ABNORMAL TRANSAMINASES: ICD-10-CM

## 2022-04-28 DIAGNOSIS — K74.60 HEPATIC CIRRHOSIS, UNSPECIFIED HEPATIC CIRRHOSIS TYPE, UNSPECIFIED WHETHER ASCITES PRESENT: Primary | ICD-10-CM

## 2022-04-28 DIAGNOSIS — B18.2 CHRONIC HEPATITIS C WITHOUT HEPATIC COMA: ICD-10-CM

## 2022-04-28 PROCEDURE — 3074F PR MOST RECENT SYSTOLIC BLOOD PRESSURE < 130 MM HG: ICD-10-PCS | Mod: CPTII,,, | Performed by: PHYSICIAN ASSISTANT

## 2022-04-28 PROCEDURE — 1159F MED LIST DOCD IN RCRD: CPT | Mod: CPTII,,, | Performed by: PHYSICIAN ASSISTANT

## 2022-04-28 PROCEDURE — 1160F PR REVIEW ALL MEDS BY PRESCRIBER/CLIN PHARMACIST DOCUMENTED: ICD-10-PCS | Mod: CPTII,,, | Performed by: PHYSICIAN ASSISTANT

## 2022-04-28 PROCEDURE — 99215 OFFICE O/P EST HI 40 MIN: CPT | Mod: S$PBB,,, | Performed by: PHYSICIAN ASSISTANT

## 2022-04-28 PROCEDURE — 3008F PR BODY MASS INDEX (BMI) DOCUMENTED: ICD-10-PCS | Mod: CPTII,,, | Performed by: PHYSICIAN ASSISTANT

## 2022-04-28 PROCEDURE — 3074F SYST BP LT 130 MM HG: CPT | Mod: CPTII,,, | Performed by: PHYSICIAN ASSISTANT

## 2022-04-28 PROCEDURE — 3078F PR MOST RECENT DIASTOLIC BLOOD PRESSURE < 80 MM HG: ICD-10-PCS | Mod: CPTII,,, | Performed by: PHYSICIAN ASSISTANT

## 2022-04-28 PROCEDURE — 99999 PR PBB SHADOW E&M-EST. PATIENT-LVL IV: CPT | Mod: PBBFAC,,, | Performed by: PHYSICIAN ASSISTANT

## 2022-04-28 PROCEDURE — 1160F RVW MEDS BY RX/DR IN RCRD: CPT | Mod: CPTII,,, | Performed by: PHYSICIAN ASSISTANT

## 2022-04-28 PROCEDURE — 99215 PR OFFICE/OUTPT VISIT, EST, LEVL V, 40-54 MIN: ICD-10-PCS | Mod: S$PBB,,, | Performed by: PHYSICIAN ASSISTANT

## 2022-04-28 PROCEDURE — 99999 PR PBB SHADOW E&M-EST. PATIENT-LVL IV: ICD-10-PCS | Mod: PBBFAC,,, | Performed by: PHYSICIAN ASSISTANT

## 2022-04-28 PROCEDURE — 3078F DIAST BP <80 MM HG: CPT | Mod: CPTII,,, | Performed by: PHYSICIAN ASSISTANT

## 2022-04-28 PROCEDURE — 4010F ACE/ARB THERAPY RXD/TAKEN: CPT | Mod: CPTII,,, | Performed by: PHYSICIAN ASSISTANT

## 2022-04-28 PROCEDURE — 4010F PR ACE/ARB THEARPY RXD/TAKEN: ICD-10-PCS | Mod: CPTII,,, | Performed by: PHYSICIAN ASSISTANT

## 2022-04-28 PROCEDURE — 3008F BODY MASS INDEX DOCD: CPT | Mod: CPTII,,, | Performed by: PHYSICIAN ASSISTANT

## 2022-04-28 PROCEDURE — 1159F PR MEDICATION LIST DOCUMENTED IN MEDICAL RECORD: ICD-10-PCS | Mod: CPTII,,, | Performed by: PHYSICIAN ASSISTANT

## 2022-04-28 PROCEDURE — 99214 OFFICE O/P EST MOD 30 MIN: CPT | Mod: PBBFAC | Performed by: PHYSICIAN ASSISTANT

## 2022-04-28 RX ORDER — ACETAMINOPHEN 500 MG
TABLET ORAL
COMMUNITY
Start: 2022-04-12 | End: 2022-08-31

## 2022-04-28 RX ORDER — VELPATASVIR AND SOFOSBUVIR 100; 400 MG/1; MG/1
1 TABLET, FILM COATED ORAL DAILY
Qty: 84 TABLET | Refills: 0 | Status: SHIPPED | OUTPATIENT
Start: 2022-04-28 | End: 2022-09-02

## 2022-04-28 RX ORDER — CHLORHEXIDINE GLUCONATE ORAL RINSE 1.2 MG/ML
SOLUTION DENTAL
COMMUNITY
Start: 2022-03-31 | End: 2022-09-02
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
## 2022-04-28 NOTE — PATIENT INSTRUCTIONS
Appointment with urology for enlarged bladder / prostate    You need the vaccine for Hepatitis A and B (Twinrix)  - 1st shot --> 2nd shot at 4 weeks --> 3rd shot at 6 months  - Call to schedule in injection clinic: [phone]    3. Call [phone] to schedule EGD (upper scope)    4. Wait for [facility] to call you about HCV treatment. Let me know when you start treatment.

## 2022-05-05 ENCOUNTER — TELEPHONE (OUTPATIENT)
Dept: HEPATOLOGY | Facility: CLINIC | Age: 62
End: 2022-05-05
Payer: MEDICAID

## 2022-05-05 DIAGNOSIS — B18.2 CHRONIC HEPATITIS C WITHOUT HEPATIC COMA: Primary | ICD-10-CM

## 2022-05-05 NOTE — TELEPHONE ENCOUNTER
----- Message from Autumn Lassiter RN sent at 5/4/2022  2:17 PM CDT -----  Hi, I spoke with patient.  He reports starting Epclusa on 4/29/22.  Please provide lab orders.  Thanks

## 2022-05-05 NOTE — TELEPHONE ENCOUNTER
Msg from provider mailed to patient.  Labs scheduled 5/27 and 10/13.  Appt with provider already scheduled on 6/20; appt reminder notices mailed.

## 2022-05-05 NOTE — TELEPHONE ENCOUNTER
Pt beginning 12 weeks of Epclusa on 4/29/22  Anticipated treatment end date: 7/21/22  Minnie 1a  Treatment naive  F4    Pls update episode and schedule:  - LFT, HCV RNA, AFP at week 4 - 5/26/22  - f/u visit w/ me around week 8  - CBC, CMP, INR, AFP, HCV RNA - SVR12 - 10/13/22

## 2022-05-19 ENCOUNTER — CLINICAL SUPPORT (OUTPATIENT)
Dept: INFECTIOUS DISEASES | Facility: CLINIC | Age: 62
End: 2022-05-19
Payer: MEDICAID

## 2022-05-19 PROCEDURE — 90471 IMMUNIZATION ADMIN: CPT | Mod: PBBFAC

## 2022-05-19 NOTE — PROGRESS NOTES
Patient received the Twinrix #1 vaccine in the left deltoid. Pt tolerated well. Pt asked to wait in the clinic 15 minutes after injection in the event of an allergic reaction. Pt verbalized understanding. Pt left in NAD.

## 2022-05-26 ENCOUNTER — LAB VISIT (OUTPATIENT)
Dept: LAB | Facility: HOSPITAL | Age: 62
End: 2022-05-26
Attending: FAMILY MEDICINE
Payer: MEDICAID

## 2022-05-26 DIAGNOSIS — B18.2 CHRONIC HEPATITIS C WITHOUT HEPATIC COMA: ICD-10-CM

## 2022-05-26 LAB
AFP SERPL-MCNC: 9.6 NG/ML (ref 0–8.4)
ALBUMIN SERPL BCP-MCNC: 4.1 G/DL (ref 3.5–5.2)
ALP SERPL-CCNC: 81 U/L (ref 55–135)
ALT SERPL W/O P-5'-P-CCNC: 24 U/L (ref 10–44)
AST SERPL-CCNC: 36 U/L (ref 10–40)
BILIRUB DIRECT SERPL-MCNC: 0.3 MG/DL (ref 0.1–0.3)
BILIRUB SERPL-MCNC: 0.6 MG/DL (ref 0.1–1)
PROT SERPL-MCNC: 8.4 G/DL (ref 6–8.4)

## 2022-05-26 PROCEDURE — 80076 HEPATIC FUNCTION PANEL: CPT | Performed by: PHYSICIAN ASSISTANT

## 2022-05-26 PROCEDURE — 82105 ALPHA-FETOPROTEIN SERUM: CPT | Performed by: PHYSICIAN ASSISTANT

## 2022-05-26 PROCEDURE — 87522 HEPATITIS C REVRS TRNSCRPJ: CPT | Performed by: PHYSICIAN ASSISTANT

## 2022-05-29 LAB — HCV RNA SERPL NAA+PROBE-ACNC: NORMAL IU/ML

## 2022-05-30 ENCOUNTER — TELEPHONE (OUTPATIENT)
Dept: HEPATOLOGY | Facility: CLINIC | Age: 62
End: 2022-05-30
Payer: MEDICAID

## 2022-05-30 DIAGNOSIS — K74.60 HEPATIC CIRRHOSIS, UNSPECIFIED HEPATIC CIRRHOSIS TYPE, UNSPECIFIED WHETHER ASCITES PRESENT: Primary | ICD-10-CM

## 2022-05-30 NOTE — TELEPHONE ENCOUNTER
Pt began 12 weeks of Epclusa on 4/29/22  Anticipated treatment end date: 7/21/22  Minnie 1a  Treatment naive  F4    5/26/22  LFT normal  HCV neg  AFP 9.6 (down from 31)    Please tell pt:  Labs look good   Liver numbers are normal now   Hep C virus is no longer showing up in blood, but do not stop treatment yet!   It is very important to continue the full 12 weeks of Epclusa. Try not to miss any doses. Treatment should end in July.   There is a small chance the virus can return during the 3 months after treatment ends. We will monitor blood for this.    Liver cancer screening blood test has come down. This is good. Next liver imaging is due in October    Keep appointment with me 6/20/22  Schedule u/s abdomen in October     Next labs: CBC, CMP, INR, AFP, HCV RNA - SVR12 - 10/13/22

## 2022-05-30 NOTE — TELEPHONE ENCOUNTER
Attempt made to reach patient.  Msg from PA Scheuermann left on his VM and mailed to him.  Labs and US scheduled 10/13/22; appt reminder notice mailed.

## 2022-06-21 ENCOUNTER — CLINICAL SUPPORT (OUTPATIENT)
Dept: INFECTIOUS DISEASES | Facility: CLINIC | Age: 62
End: 2022-06-21
Payer: MEDICAID

## 2022-06-21 PROCEDURE — 90471 IMMUNIZATION ADMIN: CPT | Mod: PBBFAC

## 2022-06-22 ENCOUNTER — TELEPHONE (OUTPATIENT)
Dept: HEPATOLOGY | Facility: CLINIC | Age: 62
End: 2022-06-22
Payer: MEDICAID

## 2022-06-22 NOTE — TELEPHONE ENCOUNTER
Patient a no-show for scheduled appt with PA Scheuermann on 6/20/22.  I spoke with him.  Appt with provider scheduled 7/15/22; appt reminder notice mailed.

## 2022-07-15 ENCOUNTER — TELEPHONE (OUTPATIENT)
Dept: HEPATOLOGY | Facility: CLINIC | Age: 62
End: 2022-07-15
Payer: MEDICAID

## 2022-07-15 NOTE — TELEPHONE ENCOUNTER
I spoke with patient on 7/14/22.  He confirmed scheduled appt with PA Scheuermann on 7/15/22 at 9:20 am.  He phoned the office shortly after scheduled appt time today stating that he would be about 45 minutes late.  Provider okayed late arrival.  Patient still has not arrived for scheduled visit. Attempt made to reach him again.  LVM asking that he call our office back for rescheduling and sent a letter stating the same.

## 2022-08-31 ENCOUNTER — HOSPITAL ENCOUNTER (EMERGENCY)
Facility: HOSPITAL | Age: 62
Discharge: HOME | End: 2022-08-31
Attending: EMERGENCY MEDICINE
Payer: MEDICAID

## 2022-08-31 VITALS
BODY MASS INDEX: 22.35 KG/M2 | HEIGHT: 72 IN | TEMPERATURE: 98 F | RESPIRATION RATE: 17 BRPM | DIASTOLIC BLOOD PRESSURE: 78 MMHG | HEART RATE: 62 BPM | OXYGEN SATURATION: 99 % | SYSTOLIC BLOOD PRESSURE: 166 MMHG | WEIGHT: 165 LBS

## 2022-08-31 DIAGNOSIS — M54.40 BILATERAL LOW BACK PAIN WITH SCIATICA, SCIATICA LATERALITY UNSPECIFIED, UNSPECIFIED CHRONICITY: ICD-10-CM

## 2022-08-31 DIAGNOSIS — M54.12 CERVICAL RADICULOPATHY: ICD-10-CM

## 2022-08-31 DIAGNOSIS — M54.2 ACUTE NECK PAIN: Primary | ICD-10-CM

## 2022-08-31 PROCEDURE — 99284 EMERGENCY DEPT VISIT MOD MDM: CPT | Mod: 25,ER

## 2022-08-31 PROCEDURE — 63600175 PHARM REV CODE 636 W HCPCS: Mod: ER | Performed by: EMERGENCY MEDICINE

## 2022-08-31 PROCEDURE — 25000003 PHARM REV CODE 250: Mod: ER | Performed by: EMERGENCY MEDICINE

## 2022-08-31 RX ORDER — NAPROXEN 500 MG/1
500 TABLET ORAL 2 TIMES DAILY PRN
Qty: 20 TABLET | Refills: 0 | Status: SHIPPED | OUTPATIENT
Start: 2022-08-31

## 2022-08-31 RX ORDER — NAPROXEN 250 MG/1
500 TABLET ORAL
Status: COMPLETED | OUTPATIENT
Start: 2022-08-31 | End: 2022-08-31

## 2022-08-31 RX ORDER — ACETAMINOPHEN 500 MG
1000 TABLET ORAL EVERY 6 HOURS PRN
Qty: 20 TABLET | Refills: 0 | Status: SHIPPED | OUTPATIENT
Start: 2022-08-31 | End: 2024-03-23

## 2022-08-31 RX ORDER — CYCLOBENZAPRINE HCL 10 MG
10 TABLET ORAL
Status: COMPLETED | OUTPATIENT
Start: 2022-08-31 | End: 2022-08-31

## 2022-08-31 RX ORDER — GABAPENTIN 600 MG/1
600 TABLET ORAL 3 TIMES DAILY
Qty: 21 TABLET | Refills: 0 | Status: SHIPPED | OUTPATIENT
Start: 2022-08-31 | End: 2022-09-07

## 2022-08-31 RX ORDER — CYCLOBENZAPRINE HCL 10 MG
10 TABLET ORAL 3 TIMES DAILY PRN
Qty: 20 TABLET | Refills: 0 | Status: SHIPPED | OUTPATIENT
Start: 2022-08-31 | End: 2022-09-05

## 2022-08-31 RX ORDER — DEXAMETHASONE 4 MG/1
4 TABLET ORAL
Status: COMPLETED | OUTPATIENT
Start: 2022-08-31 | End: 2022-08-31

## 2022-08-31 RX ADMIN — DEXAMETHASONE 4 MG: 4 TABLET ORAL at 11:08

## 2022-08-31 RX ADMIN — CYCLOBENZAPRINE 10 MG: 10 TABLET, FILM COATED ORAL at 11:08

## 2022-08-31 RX ADMIN — NAPROXEN 500 MG: 250 TABLET ORAL at 11:08
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
## 2022-08-31 NOTE — DISCHARGE INSTRUCTIONS
1. Ice affected area 2x a day for 15 minutes for 1 week, then 1x day for 15 minutes as needed for pain management.  2. Activities as tolerated  3. Continue taking naproxen for pain management.  4. Consideration MRI of effected area to rule out further pathology

## 2022-08-31 NOTE — ED NOTES
PT VERBALIZED UNDERSTANDING TO FOLLOW UP WITH PCP FOR CONSIDERATION FOR A MRI. NAD AT THIS TIME. AAOX4. RX AND D/C INFOMATION GIVEN TO REVIEWED WITH PT. PT DENIES ANY FURTHER NEEDS OR QUESTIONS. AMB OUT TO POV WITH STEADY GAIT.

## 2022-08-31 NOTE — ED NOTES
PT C/O LEFT SIDED NECK PAIN X 2-3 MONTHS. STATES PAIN IS WORSE WHEN HE WAKES UP IN THE AM.   IS ALREADY TAKING MEDICATION FOR NEUROPATHY IN HIS LEFT LEG.

## 2022-09-02 ENCOUNTER — OFFICE VISIT (OUTPATIENT)
Dept: HEPATOLOGY | Facility: CLINIC | Age: 62
End: 2022-09-02
Payer: MEDICAID

## 2022-09-02 VITALS
OXYGEN SATURATION: 97 % | TEMPERATURE: 99 F | DIASTOLIC BLOOD PRESSURE: 84 MMHG | WEIGHT: 166.25 LBS | HEART RATE: 68 BPM | SYSTOLIC BLOOD PRESSURE: 179 MMHG | BODY MASS INDEX: 22.54 KG/M2

## 2022-09-02 DIAGNOSIS — D69.6 THROMBOCYTOPENIA: ICD-10-CM

## 2022-09-02 DIAGNOSIS — K74.60 HEPATIC CIRRHOSIS, UNSPECIFIED HEPATIC CIRRHOSIS TYPE, UNSPECIFIED WHETHER ASCITES PRESENT: Primary | ICD-10-CM

## 2022-09-02 DIAGNOSIS — B18.2 CHRONIC HEPATITIS C WITHOUT HEPATIC COMA: ICD-10-CM

## 2022-09-02 PROCEDURE — 4010F PR ACE/ARB THEARPY RXD/TAKEN: ICD-10-PCS | Mod: CPTII,,, | Performed by: PHYSICIAN ASSISTANT

## 2022-09-02 PROCEDURE — 1159F MED LIST DOCD IN RCRD: CPT | Mod: CPTII,,, | Performed by: PHYSICIAN ASSISTANT

## 2022-09-02 PROCEDURE — 99999 PR PBB SHADOW E&M-EST. PATIENT-LVL IV: ICD-10-PCS | Mod: PBBFAC,,, | Performed by: PHYSICIAN ASSISTANT

## 2022-09-02 PROCEDURE — 3077F SYST BP >= 140 MM HG: CPT | Mod: CPTII,,, | Performed by: PHYSICIAN ASSISTANT

## 2022-09-02 PROCEDURE — 99214 OFFICE O/P EST MOD 30 MIN: CPT | Mod: S$PBB,,, | Performed by: PHYSICIAN ASSISTANT

## 2022-09-02 PROCEDURE — 3008F PR BODY MASS INDEX (BMI) DOCUMENTED: ICD-10-PCS | Mod: CPTII,,, | Performed by: PHYSICIAN ASSISTANT

## 2022-09-02 PROCEDURE — 3079F DIAST BP 80-89 MM HG: CPT | Mod: CPTII,,, | Performed by: PHYSICIAN ASSISTANT

## 2022-09-02 PROCEDURE — 99214 PR OFFICE/OUTPT VISIT, EST, LEVL IV, 30-39 MIN: ICD-10-PCS | Mod: S$PBB,,, | Performed by: PHYSICIAN ASSISTANT

## 2022-09-02 PROCEDURE — 1160F PR REVIEW ALL MEDS BY PRESCRIBER/CLIN PHARMACIST DOCUMENTED: ICD-10-PCS | Mod: CPTII,,, | Performed by: PHYSICIAN ASSISTANT

## 2022-09-02 PROCEDURE — 1159F PR MEDICATION LIST DOCUMENTED IN MEDICAL RECORD: ICD-10-PCS | Mod: CPTII,,, | Performed by: PHYSICIAN ASSISTANT

## 2022-09-02 PROCEDURE — 99999 PR PBB SHADOW E&M-EST. PATIENT-LVL IV: CPT | Mod: PBBFAC,,, | Performed by: PHYSICIAN ASSISTANT

## 2022-09-02 PROCEDURE — 3008F BODY MASS INDEX DOCD: CPT | Mod: CPTII,,, | Performed by: PHYSICIAN ASSISTANT

## 2022-09-02 PROCEDURE — 3079F PR MOST RECENT DIASTOLIC BLOOD PRESSURE 80-89 MM HG: ICD-10-PCS | Mod: CPTII,,, | Performed by: PHYSICIAN ASSISTANT

## 2022-09-02 PROCEDURE — 4010F ACE/ARB THERAPY RXD/TAKEN: CPT | Mod: CPTII,,, | Performed by: PHYSICIAN ASSISTANT

## 2022-09-02 PROCEDURE — 1160F RVW MEDS BY RX/DR IN RCRD: CPT | Mod: CPTII,,, | Performed by: PHYSICIAN ASSISTANT

## 2022-09-02 PROCEDURE — 3077F PR MOST RECENT SYSTOLIC BLOOD PRESSURE >= 140 MM HG: ICD-10-PCS | Mod: CPTII,,, | Performed by: PHYSICIAN ASSISTANT

## 2022-09-02 PROCEDURE — 99214 OFFICE O/P EST MOD 30 MIN: CPT | Mod: PBBFAC | Performed by: PHYSICIAN ASSISTANT

## 2022-09-02 NOTE — PATIENT INSTRUCTIONS
Call [phone] to reschedule Urology (bladder doctor) appointment  Have labs and ultrasound in Oct to see if HCV is cured and for regular liver monitoring and liver cancer screening  Call [phone] to schedule EGD (upper scope)    If all results are okay, next visit with me will be due in 4/2023
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
## 2022-09-15 ENCOUNTER — TELEPHONE (OUTPATIENT)
Dept: ENDOSCOPY | Facility: HOSPITAL | Age: 62
End: 2022-09-15
Payer: MEDICAID

## 2022-10-05 ENCOUNTER — HOSPITAL ENCOUNTER (EMERGENCY)
Facility: HOSPITAL | Age: 62
Discharge: HOME | End: 2022-10-06
Attending: EMERGENCY MEDICINE
Payer: MEDICAID

## 2022-10-05 VITALS
HEIGHT: 72 IN | BODY MASS INDEX: 23.7 KG/M2 | TEMPERATURE: 98 F | OXYGEN SATURATION: 98 % | RESPIRATION RATE: 21 BRPM | SYSTOLIC BLOOD PRESSURE: 103 MMHG | HEART RATE: 85 BPM | WEIGHT: 175 LBS | DIASTOLIC BLOOD PRESSURE: 69 MMHG

## 2022-10-05 DIAGNOSIS — S20.211A RIB CONTUSION, RIGHT, INITIAL ENCOUNTER: Primary | ICD-10-CM
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
## 2022-10-05 PROCEDURE — 25000003 PHARM REV CODE 250: Mod: ER | Performed by: PHYSICIAN ASSISTANT

## 2022-10-05 PROCEDURE — 99284 EMERGENCY DEPT VISIT MOD MDM: CPT | Mod: 25,ER

## 2022-10-05 RX ORDER — IBUPROFEN 600 MG/1
600 TABLET ORAL
Status: COMPLETED | OUTPATIENT
Start: 2022-10-05 | End: 2022-10-05

## 2022-10-05 RX ORDER — LIDOCAINE 50 MG/G
1 PATCH TOPICAL DAILY
Qty: 6 PATCH | Refills: 0 | Status: SHIPPED | OUTPATIENT
Start: 2022-10-05

## 2022-10-05 RX ORDER — LIDOCAINE 50 MG/G
1 PATCH TOPICAL
Status: DISCONTINUED | OUTPATIENT
Start: 2022-10-05 | End: 2022-10-06 | Stop reason: HOSPADM

## 2022-10-05 RX ORDER — MELOXICAM 7.5 MG/1
7.5 TABLET ORAL DAILY
Qty: 12 TABLET | Refills: 0 | Status: SHIPPED | OUTPATIENT
Start: 2022-10-05 | End: 2022-10-23 | Stop reason: ALTCHOICE

## 2022-10-05 RX ADMIN — IBUPROFEN 600 MG: 600 TABLET ORAL at 11:10

## 2022-10-05 RX ADMIN — LIDOCAINE 1 PATCH: 50 PATCH TOPICAL at 11:10

## 2022-10-06 PROCEDURE — 99900035 HC TECH TIME PER 15 MIN (STAT): Mod: ER

## 2022-10-06 PROCEDURE — 94799 UNLISTED PULMONARY SVC/PX: CPT | Mod: ER

## 2022-10-06 NOTE — DISCHARGE INSTRUCTIONS
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
## 2022-10-21 DIAGNOSIS — K74.60 HEPATIC CIRRHOSIS, UNSPECIFIED HEPATIC CIRRHOSIS TYPE: Primary | ICD-10-CM

## 2022-10-23 ENCOUNTER — HOSPITAL ENCOUNTER (EMERGENCY)
Facility: HOSPITAL | Age: 62
Discharge: HOME | End: 2022-10-23
Attending: EMERGENCY MEDICINE
Payer: MEDICAID

## 2022-10-23 VITALS
RESPIRATION RATE: 18 BRPM | HEIGHT: 72 IN | DIASTOLIC BLOOD PRESSURE: 84 MMHG | BODY MASS INDEX: 23.7 KG/M2 | HEART RATE: 68 BPM | OXYGEN SATURATION: 98 % | SYSTOLIC BLOOD PRESSURE: 177 MMHG | TEMPERATURE: 98 F | WEIGHT: 175 LBS

## 2022-10-23 DIAGNOSIS — L02.414 ABSCESS OF LEFT FOREARM: Primary | ICD-10-CM

## 2022-10-23 PROCEDURE — 10060 I&D ABSCESS SIMPLE/SINGLE: CPT | Mod: ER

## 2022-10-23 PROCEDURE — 25000003 PHARM REV CODE 250: Mod: ER | Performed by: NURSE PRACTITIONER

## 2022-10-23 PROCEDURE — 99284 EMERGENCY DEPT VISIT MOD MDM: CPT | Mod: 25,ER

## 2022-10-23 RX ORDER — SULFAMETHOXAZOLE AND TRIMETHOPRIM 800; 160 MG/1; MG/1
1 TABLET ORAL 2 TIMES DAILY
Qty: 14 TABLET | Refills: 0 | OUTPATIENT
Start: 2022-10-23 | End: 2022-11-03

## 2022-10-23 RX ORDER — LIDOCAINE HYDROCHLORIDE 10 MG/ML
10 INJECTION INFILTRATION; PERINEURAL
Status: COMPLETED | OUTPATIENT
Start: 2022-10-23 | End: 2022-10-23

## 2022-10-23 RX ORDER — SULFAMETHOXAZOLE AND TRIMETHOPRIM 800; 160 MG/1; MG/1
1 TABLET ORAL
Status: COMPLETED | OUTPATIENT
Start: 2022-10-23 | End: 2022-10-23

## 2022-10-23 RX ORDER — SULINDAC 150 MG/1
150 TABLET ORAL 2 TIMES DAILY
Qty: 10 TABLET | Refills: 0 | Status: SHIPPED | OUTPATIENT
Start: 2022-10-23 | End: 2022-10-28

## 2022-10-23 RX ADMIN — SULFAMETHOXAZOLE AND TRIMETHOPRIM 1 TABLET: 800; 160 TABLET ORAL at 03:10

## 2022-10-23 RX ADMIN — LIDOCAINE HYDROCHLORIDE 10 ML: 10 INJECTION, SOLUTION INFILTRATION; PERINEURAL at 03:10

## 2022-10-23 NOTE — DISCHARGE INSTRUCTIONS
You have been prescribed clinoril (sulindac), an anti-inflammatory.  Take this medication whether you feel you need it or not.  Do not take ibuprofen, naproxen or other NSAID's medications while taking this medication.  Take antibiotics as ordered.   Return to the Emergency Department for any worsening, change in condition, or any emergent concerns.
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
## 2022-10-31 ENCOUNTER — HOSPITAL ENCOUNTER (INPATIENT)
Facility: HOSPITAL | Age: 62
LOS: 3 days | Discharge: HOME | DRG: 025 | End: 2022-11-03
Attending: EMERGENCY MEDICINE | Admitting: PSYCHIATRY & NEUROLOGY
Payer: MEDICAID

## 2022-10-31 DIAGNOSIS — M54.9 BACK PAIN: ICD-10-CM

## 2022-10-31 DIAGNOSIS — S09.90XA INJURY OF HEAD, INITIAL ENCOUNTER: ICD-10-CM

## 2022-10-31 DIAGNOSIS — M79.601 RIGHT ARM PAIN: ICD-10-CM

## 2022-10-31 DIAGNOSIS — R41.82 ALTERED MENTAL STATUS: ICD-10-CM

## 2022-10-31 DIAGNOSIS — M25.511 SHOULDER PAIN, RIGHT: ICD-10-CM

## 2022-10-31 DIAGNOSIS — S06.5XAA BILATERAL SUBDURAL HEMATOMAS: Primary | ICD-10-CM
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
## 2022-10-31 DIAGNOSIS — R41.82 ALTERED MENTAL STATUS, UNSPECIFIED ALTERED MENTAL STATUS TYPE: ICD-10-CM
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
## 2022-10-31 LAB
ALBUMIN SERPL BCP-MCNC: 4 G/DL (ref 3.5–5.2)
ALBUMIN SERPL-MCNC: 4.1 G/DL (ref 3.3–5.5)
ALP SERPL-CCNC: 105 U/L (ref 55–135)
ALP SERPL-CCNC: 93 U/L (ref 42–141)
ALT SERPL W/O P-5'-P-CCNC: 14 U/L (ref 10–44)
AMPHET+METHAMPHET UR QL: ABNORMAL
ANION GAP SERPL CALC-SCNC: 7 MMOL/L (ref 8–16)
APTT BLDCRRT: 27.7 SEC (ref 21–32)
AST SERPL-CCNC: 25 U/L (ref 10–40)
BARBITURATES UR QL SCN>200 NG/ML: NEGATIVE
BASOPHILS # BLD AUTO: 0.01 K/UL (ref 0–0.2)
BASOPHILS NFR BLD: 0.1 % (ref 0–1.9)
BENZODIAZ UR QL SCN>200 NG/ML: ABNORMAL
BILIRUB SERPL-MCNC: 0.6 MG/DL (ref 0.1–1)
BILIRUB SERPL-MCNC: 0.7 MG/DL (ref 0.2–1.6)
BILIRUB UR QL STRIP: NEGATIVE
BUN SERPL-MCNC: 13 MG/DL (ref 8–23)
BUN SERPL-MCNC: 14 MG/DL (ref 7–22)
BZE UR QL SCN: NEGATIVE
CALCIUM SERPL-MCNC: 10.2 MG/DL (ref 8.7–10.5)
CALCIUM SERPL-MCNC: 10.4 MG/DL (ref 8–10.3)
CANNABINOIDS UR QL SCN: NEGATIVE
CHLORIDE SERPL-SCNC: 102 MMOL/L (ref 98–108)
CHLORIDE SERPL-SCNC: 104 MMOL/L (ref 95–110)
CHOLEST SERPL-MCNC: 155 MG/DL (ref 120–199)
CHOLEST/HDLC SERPL: 3.4 {RATIO} (ref 2–5)
CLARITY UR REFRACT.AUTO: CLEAR
CO2 SERPL-SCNC: 28 MMOL/L (ref 23–29)
COLOR UR AUTO: YELLOW
CREAT SERPL-MCNC: 0.8 MG/DL (ref 0.5–1.4)
CREAT SERPL-MCNC: 1 MG/DL (ref 0.6–1.2)
CREAT UR-MCNC: 80 MG/DL (ref 23–375)
CTP QC/QA: YES
DIFFERENTIAL METHOD: ABNORMAL
EOSINOPHIL # BLD AUTO: 0.1 K/UL (ref 0–0.5)
EOSINOPHIL NFR BLD: 1.7 % (ref 0–8)
ERYTHROCYTE [DISTWIDTH] IN BLOOD BY AUTOMATED COUNT: 12.8 % (ref 11.5–14.5)
EST. GFR  (NO RACE VARIABLE): >60 ML/MIN/1.73 M^2
ESTIMATED AVG GLUCOSE: 103 MG/DL (ref 68–131)
ETHANOL SERPL-MCNC: <10 MG/DL
GLUCOSE SERPL-MCNC: 95 MG/DL (ref 70–110)
GLUCOSE SERPL-MCNC: 98 MG/DL (ref 73–118)
GLUCOSE UR QL STRIP: NEGATIVE
HBA1C MFR BLD: 5.2 % (ref 4–5.6)
HCT VFR BLD AUTO: 43 % (ref 40–54)
HDLC SERPL-MCNC: 45 MG/DL (ref 40–75)
HDLC SERPL: 29 % (ref 20–50)
HGB BLD-MCNC: 14.6 G/DL (ref 14–18)
HGB UR QL STRIP: NEGATIVE
HIV 1+2 AB+HIV1 P24 AG SERPL QL IA: NORMAL
IMM GRANULOCYTES # BLD AUTO: 0.02 K/UL (ref 0–0.04)
IMM GRANULOCYTES NFR BLD AUTO: 0.2 % (ref 0–0.5)
INR PPP: 1.1 (ref 0.8–1.2)
KETONES UR QL STRIP: NEGATIVE
LDLC SERPL CALC-MCNC: 95.4 MG/DL (ref 63–159)
LEUKOCYTE ESTERASE UR QL STRIP: NEGATIVE
LYMPHOCYTES # BLD AUTO: 1.8 K/UL (ref 1–4.8)
LYMPHOCYTES NFR BLD: 21 % (ref 18–48)
MCH RBC QN AUTO: 33 PG (ref 27–31)
MCHC RBC AUTO-ENTMCNC: 34 G/DL (ref 32–36)
MCV RBC AUTO: 97 FL (ref 82–98)
METHADONE UR QL SCN>300 NG/ML: NEGATIVE
MONOCYTES # BLD AUTO: 0.5 K/UL (ref 0.3–1)
MONOCYTES NFR BLD: 5.6 % (ref 4–15)
NEUTROPHILS # BLD AUTO: 6 K/UL (ref 1.8–7.7)
NEUTROPHILS NFR BLD: 71.4 % (ref 38–73)
NITRITE UR QL STRIP: NEGATIVE
NONHDLC SERPL-MCNC: 110 MG/DL
NRBC BLD-RTO: 0 /100 WBC
OPIATES UR QL SCN: NEGATIVE
PCP UR QL SCN>25 NG/ML: NEGATIVE
PH UR STRIP: 7 [PH] (ref 5–8)
PLATELET # BLD AUTO: 219 K/UL (ref 150–450)
PMV BLD AUTO: 9.3 FL (ref 9.2–12.9)
POC ALT (SGPT): 20 U/L (ref 10–47)
POC AST (SGOT): 35 U/L (ref 11–38)
POC PTINR: 1.2 (ref 0.9–1.2)
POC PTWBT: 14.4 SEC (ref 9.7–14.3)
POC TCO2: 29 MMOL/L (ref 18–33)
POTASSIUM BLD-SCNC: 4.3 MMOL/L (ref 3.6–5.1)
POTASSIUM SERPL-SCNC: 4.1 MMOL/L (ref 3.5–5.1)
PROT SERPL-MCNC: 8.2 G/DL (ref 6–8.4)
PROT UR QL STRIP: NEGATIVE
PROTEIN, POC: 8.5 G/DL (ref 6.4–8.1)
PROTHROMBIN TIME: 11.3 SEC (ref 9–12.5)
RBC # BLD AUTO: 4.43 M/UL (ref 4.6–6.2)
SAMPLE: ABNORMAL
SARS-COV-2 RDRP RESP QL NAA+PROBE: NEGATIVE
SODIUM BLD-SCNC: 150 MMOL/L (ref 128–145)
SODIUM SERPL-SCNC: 139 MMOL/L (ref 136–145)
SP GR UR STRIP: 1.01 (ref 1–1.03)
TOXICOLOGY INFORMATION: ABNORMAL
TRIGL SERPL-MCNC: 73 MG/DL (ref 30–150)
TSH SERPL DL<=0.005 MIU/L-ACNC: 0.57 UIU/ML (ref 0.4–4)
URN SPEC COLLECT METH UR: NORMAL
WBC # BLD AUTO: 8.4 K/UL (ref 3.9–12.7)

## 2022-10-31 PROCEDURE — 82077 ASSAY SPEC XCP UR&BREATH IA: CPT | Performed by: PHYSICIAN ASSISTANT

## 2022-10-31 PROCEDURE — 80307 DRUG TEST PRSMV CHEM ANLYZR: CPT | Performed by: PHYSICIAN ASSISTANT

## 2022-10-31 PROCEDURE — 81003 URINALYSIS AUTO W/O SCOPE: CPT | Mod: 59 | Performed by: PHYSICIAN ASSISTANT

## 2022-10-31 PROCEDURE — 86850 RBC ANTIBODY SCREEN: CPT | Performed by: PHYSICIAN ASSISTANT

## 2022-10-31 PROCEDURE — 85025 COMPLETE CBC W/AUTO DIFF WBC: CPT | Performed by: PHYSICIAN ASSISTANT

## 2022-10-31 PROCEDURE — 99291 CRITICAL CARE FIRST HOUR: CPT | Mod: 25

## 2022-10-31 PROCEDURE — 80053 COMPREHEN METABOLIC PANEL: CPT | Mod: ER

## 2022-10-31 PROCEDURE — 90471 IMMUNIZATION ADMIN: CPT | Mod: ER | Performed by: NURSE PRACTITIONER

## 2022-10-31 PROCEDURE — 85025 COMPLETE CBC W/AUTO DIFF WBC: CPT | Mod: ER

## 2022-10-31 PROCEDURE — 99223 PR INITIAL HOSPITAL CARE,LEVL III: ICD-10-PCS | Mod: ,,, | Performed by: PHYSICIAN ASSISTANT

## 2022-10-31 PROCEDURE — 93010 ELECTROCARDIOGRAM REPORT: CPT | Mod: ,,, | Performed by: INTERNAL MEDICINE

## 2022-10-31 PROCEDURE — 80061 LIPID PANEL: CPT | Performed by: PHYSICIAN ASSISTANT

## 2022-10-31 PROCEDURE — 84443 ASSAY THYROID STIM HORMONE: CPT | Performed by: PHYSICIAN ASSISTANT

## 2022-10-31 PROCEDURE — 63600175 PHARM REV CODE 636 W HCPCS: Mod: ER | Performed by: NURSE PRACTITIONER

## 2022-10-31 PROCEDURE — 99223 1ST HOSP IP/OBS HIGH 75: CPT | Mod: ,,, | Performed by: PHYSICIAN ASSISTANT

## 2022-10-31 PROCEDURE — 12000002 HC ACUTE/MED SURGE SEMI-PRIVATE ROOM

## 2022-10-31 PROCEDURE — 85610 PROTHROMBIN TIME: CPT | Performed by: PHYSICIAN ASSISTANT

## 2022-10-31 PROCEDURE — 87635 SARS-COV-2 COVID-19 AMP PRB: CPT | Mod: ER | Performed by: NURSE PRACTITIONER

## 2022-10-31 PROCEDURE — 85730 THROMBOPLASTIN TIME PARTIAL: CPT | Performed by: PHYSICIAN ASSISTANT

## 2022-10-31 PROCEDURE — 25000003 PHARM REV CODE 250: Mod: ER | Performed by: EMERGENCY MEDICINE

## 2022-10-31 PROCEDURE — 96374 THER/PROPH/DIAG INJ IV PUSH: CPT

## 2022-10-31 PROCEDURE — 80053 COMPREHEN METABOLIC PANEL: CPT | Performed by: PHYSICIAN ASSISTANT

## 2022-10-31 PROCEDURE — 93010 EKG 12-LEAD: ICD-10-PCS | Mod: ,,, | Performed by: INTERNAL MEDICINE

## 2022-10-31 PROCEDURE — 85610 PROTHROMBIN TIME: CPT | Mod: ER

## 2022-10-31 PROCEDURE — 93005 ELECTROCARDIOGRAM TRACING: CPT

## 2022-10-31 PROCEDURE — 90715 TDAP VACCINE 7 YRS/> IM: CPT | Mod: ER | Performed by: NURSE PRACTITIONER

## 2022-10-31 PROCEDURE — 87389 HIV-1 AG W/HIV-1&-2 AB AG IA: CPT | Performed by: PHYSICIAN ASSISTANT

## 2022-10-31 PROCEDURE — 25000003 PHARM REV CODE 250: Performed by: PHYSICIAN ASSISTANT

## 2022-10-31 PROCEDURE — 83036 HEMOGLOBIN GLYCOSYLATED A1C: CPT | Performed by: PHYSICIAN ASSISTANT

## 2022-10-31 RX ORDER — LABETALOL HCL 20 MG/4 ML
10 SYRINGE (ML) INTRAVENOUS EVERY 6 HOURS PRN
Status: DISCONTINUED | OUTPATIENT
Start: 2022-10-31 | End: 2022-10-31

## 2022-10-31 RX ORDER — LABETALOL HCL 20 MG/4 ML
10 SYRINGE (ML) INTRAVENOUS EVERY 6 HOURS PRN
Status: DISCONTINUED | OUTPATIENT
Start: 2022-10-31 | End: 2022-11-01

## 2022-10-31 RX ORDER — NICARDIPINE HYDROCHLORIDE 0.2 MG/ML
0-15 INJECTION INTRAVENOUS CONTINUOUS
Status: DISCONTINUED | OUTPATIENT
Start: 2022-10-31 | End: 2022-11-01

## 2022-10-31 RX ORDER — ACETAMINOPHEN 325 MG/1
650 TABLET ORAL EVERY 6 HOURS PRN
Status: DISCONTINUED | OUTPATIENT
Start: 2022-10-31 | End: 2022-11-03 | Stop reason: HOSPADM

## 2022-10-31 RX ORDER — LEVETIRACETAM 500 MG/5ML
500 INJECTION, SOLUTION, CONCENTRATE INTRAVENOUS EVERY 12 HOURS
Status: DISCONTINUED | OUTPATIENT
Start: 2022-11-01 | End: 2022-11-01

## 2022-10-31 RX ORDER — AMOXICILLIN 250 MG
1 CAPSULE ORAL 2 TIMES DAILY
Status: DISCONTINUED | OUTPATIENT
Start: 2022-10-31 | End: 2022-11-03 | Stop reason: HOSPADM

## 2022-10-31 RX ORDER — SODIUM CHLORIDE 0.9 % (FLUSH) 0.9 %
10 SYRINGE (ML) INJECTION
Status: DISCONTINUED | OUTPATIENT
Start: 2022-10-31 | End: 2022-11-03 | Stop reason: HOSPADM

## 2022-10-31 RX ORDER — ONDANSETRON 2 MG/ML
4 INJECTION INTRAMUSCULAR; INTRAVENOUS EVERY 8 HOURS PRN
Status: DISCONTINUED | OUTPATIENT
Start: 2022-10-31 | End: 2022-11-03 | Stop reason: HOSPADM

## 2022-10-31 RX ORDER — LEVETIRACETAM 500 MG/5ML
1000 INJECTION, SOLUTION, CONCENTRATE INTRAVENOUS
Status: COMPLETED | OUTPATIENT
Start: 2022-10-31 | End: 2022-10-31

## 2022-10-31 RX ADMIN — NICARDIPINE HYDROCHLORIDE 5 MG/HR: 0.2 INJECTION, SOLUTION INTRAVENOUS at 06:10

## 2022-10-31 RX ADMIN — LEVETIRACETAM 1000 MG: 100 INJECTION, SOLUTION INTRAVENOUS at 06:10

## 2022-10-31 RX ADMIN — SENNOSIDES AND DOCUSATE SODIUM 1 TABLET: 50; 8.6 TABLET ORAL at 10:10

## 2022-10-31 RX ADMIN — TETANUS TOXOID, REDUCED DIPHTHERIA TOXOID AND ACELLULAR PERTUSSIS VACCINE, ADSORBED 0.5 ML: 5; 2.5; 8; 8; 2.5 SUSPENSION INTRAMUSCULAR at 05:10
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
## 2022-11-01 PROBLEM — F11.90 OPIOID USE: Status: ACTIVE | Noted: 2022-11-01

## 2022-11-01 PROBLEM — S06.5XAA SDH (SUBDURAL HEMATOMA): Status: ACTIVE | Noted: 2022-11-01

## 2022-11-01 PROBLEM — G93.5 BRAIN COMPRESSION: Status: ACTIVE | Noted: 2022-11-01

## 2022-11-01 LAB
ABO + RH BLD: NORMAL
ALBUMIN SERPL BCP-MCNC: 3.9 G/DL (ref 3.5–5.2)
ALP SERPL-CCNC: 95 U/L (ref 55–135)
ALT SERPL W/O P-5'-P-CCNC: 11 U/L (ref 10–44)
ANION GAP SERPL CALC-SCNC: 8 MMOL/L (ref 8–16)
APTT BLDCRRT: 27.9 SEC (ref 21–32)
AST SERPL-CCNC: 23 U/L (ref 10–40)
BASOPHILS # BLD AUTO: 0.02 K/UL (ref 0–0.2)
BASOPHILS NFR BLD: 0.3 % (ref 0–1.9)
BILIRUB SERPL-MCNC: 0.7 MG/DL (ref 0.1–1)
BLD GP AB SCN CELLS X3 SERPL QL: NORMAL
BUN SERPL-MCNC: 12 MG/DL (ref 8–23)
CALCIUM SERPL-MCNC: 9.7 MG/DL (ref 8.7–10.5)
CHLORIDE SERPL-SCNC: 105 MMOL/L (ref 95–110)
CO2 SERPL-SCNC: 25 MMOL/L (ref 23–29)
CREAT SERPL-MCNC: 0.8 MG/DL (ref 0.5–1.4)
DIFFERENTIAL METHOD: ABNORMAL
EOSINOPHIL # BLD AUTO: 0.1 K/UL (ref 0–0.5)
EOSINOPHIL NFR BLD: 1.8 % (ref 0–8)
ERYTHROCYTE [DISTWIDTH] IN BLOOD BY AUTOMATED COUNT: 12.7 % (ref 11.5–14.5)
EST. GFR  (NO RACE VARIABLE): >60 ML/MIN/1.73 M^2
GLUCOSE SERPL-MCNC: 97 MG/DL (ref 70–110)
HCT VFR BLD AUTO: 42.3 % (ref 40–54)
HGB BLD-MCNC: 14.6 G/DL (ref 14–18)
IMM GRANULOCYTES # BLD AUTO: 0.01 K/UL (ref 0–0.04)
IMM GRANULOCYTES NFR BLD AUTO: 0.1 % (ref 0–0.5)
INR PPP: 1.1 (ref 0.8–1.2)
LYMPHOCYTES # BLD AUTO: 1.7 K/UL (ref 1–4.8)
LYMPHOCYTES NFR BLD: 22.3 % (ref 18–48)
MAGNESIUM SERPL-MCNC: 1.8 MG/DL (ref 1.6–2.6)
MCH RBC QN AUTO: 33.3 PG (ref 27–31)
MCHC RBC AUTO-ENTMCNC: 34.5 G/DL (ref 32–36)
MCV RBC AUTO: 97 FL (ref 82–98)
MONOCYTES # BLD AUTO: 0.5 K/UL (ref 0.3–1)
MONOCYTES NFR BLD: 6.6 % (ref 4–15)
NEUTROPHILS # BLD AUTO: 5.4 K/UL (ref 1.8–7.7)
NEUTROPHILS NFR BLD: 68.9 % (ref 38–73)
NRBC BLD-RTO: 0 /100 WBC
PHOSPHATE SERPL-MCNC: 2.9 MG/DL (ref 2.7–4.5)
PLATELET # BLD AUTO: 218 K/UL (ref 150–450)
PMV BLD AUTO: 9.6 FL (ref 9.2–12.9)
POTASSIUM SERPL-SCNC: 3.6 MMOL/L (ref 3.5–5.1)
PROT SERPL-MCNC: 7.8 G/DL (ref 6–8.4)
PROTHROMBIN TIME: 11.4 SEC (ref 9–12.5)
RBC # BLD AUTO: 4.38 M/UL (ref 4.6–6.2)
SODIUM SERPL-SCNC: 138 MMOL/L (ref 136–145)
WBC # BLD AUTO: 7.82 K/UL (ref 3.9–12.7)

## 2022-11-01 PROCEDURE — 25000003 PHARM REV CODE 250: Performed by: PHYSICIAN ASSISTANT

## 2022-11-01 PROCEDURE — 63600175 PHARM REV CODE 636 W HCPCS

## 2022-11-01 PROCEDURE — 99233 SBSQ HOSP IP/OBS HIGH 50: CPT | Mod: ,,, | Performed by: INTERNAL MEDICINE

## 2022-11-01 PROCEDURE — 85610 PROTHROMBIN TIME: CPT | Performed by: PHYSICIAN ASSISTANT

## 2022-11-01 PROCEDURE — 63600175 PHARM REV CODE 636 W HCPCS: Performed by: PHYSICIAN ASSISTANT

## 2022-11-01 PROCEDURE — 97166 OT EVAL MOD COMPLEX 45 MIN: CPT

## 2022-11-01 PROCEDURE — 97161 PT EVAL LOW COMPLEX 20 MIN: CPT

## 2022-11-01 PROCEDURE — 99223 1ST HOSP IP/OBS HIGH 75: CPT | Mod: ,,, | Performed by: NEUROLOGICAL SURGERY

## 2022-11-01 PROCEDURE — 97530 THERAPEUTIC ACTIVITIES: CPT

## 2022-11-01 PROCEDURE — 80053 COMPREHEN METABOLIC PANEL: CPT | Performed by: PHYSICIAN ASSISTANT

## 2022-11-01 PROCEDURE — 83735 ASSAY OF MAGNESIUM: CPT | Performed by: PHYSICIAN ASSISTANT

## 2022-11-01 PROCEDURE — 20000000 HC ICU ROOM

## 2022-11-01 PROCEDURE — 99233 PR SUBSEQUENT HOSPITAL CARE,LEVL III: ICD-10-PCS | Mod: ,,, | Performed by: INTERNAL MEDICINE

## 2022-11-01 PROCEDURE — 85025 COMPLETE CBC W/AUTO DIFF WBC: CPT | Performed by: PHYSICIAN ASSISTANT

## 2022-11-01 PROCEDURE — 84100 ASSAY OF PHOSPHORUS: CPT | Performed by: PHYSICIAN ASSISTANT

## 2022-11-01 PROCEDURE — 25000003 PHARM REV CODE 250

## 2022-11-01 PROCEDURE — 94761 N-INVAS EAR/PLS OXIMETRY MLT: CPT

## 2022-11-01 PROCEDURE — 97535 SELF CARE MNGMENT TRAINING: CPT

## 2022-11-01 PROCEDURE — 99223 PR INITIAL HOSPITAL CARE,LEVL III: ICD-10-PCS | Mod: ,,, | Performed by: NEUROLOGICAL SURGERY

## 2022-11-01 PROCEDURE — 85730 THROMBOPLASTIN TIME PARTIAL: CPT | Performed by: PHYSICIAN ASSISTANT

## 2022-11-01 RX ORDER — LEVETIRACETAM 500 MG/5ML
500 INJECTION, SOLUTION, CONCENTRATE INTRAVENOUS EVERY 12 HOURS
Status: DISCONTINUED | OUTPATIENT
Start: 2022-11-01 | End: 2022-11-03 | Stop reason: HOSPADM

## 2022-11-01 RX ORDER — LABETALOL HCL 20 MG/4 ML
10 SYRINGE (ML) INTRAVENOUS EVERY 6 HOURS PRN
Status: DISCONTINUED | OUTPATIENT
Start: 2022-11-01 | End: 2022-11-03 | Stop reason: HOSPADM

## 2022-11-01 RX ORDER — AMLODIPINE BESYLATE 5 MG/1
5 TABLET ORAL DAILY
Status: DISCONTINUED | OUTPATIENT
Start: 2022-11-01 | End: 2022-11-02

## 2022-11-01 RX ORDER — OXYCODONE AND ACETAMINOPHEN 5; 325 MG/1; MG/1
1 TABLET ORAL EVERY 4 HOURS PRN
Status: DISCONTINUED | OUTPATIENT
Start: 2022-11-01 | End: 2022-11-03 | Stop reason: HOSPADM

## 2022-11-01 RX ORDER — NICARDIPINE HYDROCHLORIDE 0.2 MG/ML
0-15 INJECTION INTRAVENOUS CONTINUOUS
Status: DISCONTINUED | OUTPATIENT
Start: 2022-11-01 | End: 2022-11-03

## 2022-11-01 RX ADMIN — OXYCODONE HYDROCHLORIDE AND ACETAMINOPHEN 1 TABLET: 5; 325 TABLET ORAL at 02:11

## 2022-11-01 RX ADMIN — LEVETIRACETAM 500 MG: 100 INJECTION, SOLUTION INTRAVENOUS at 09:11

## 2022-11-01 RX ADMIN — AMLODIPINE BESYLATE 5 MG: 5 TABLET ORAL at 02:11

## 2022-11-01 RX ADMIN — LABETALOL HYDROCHLORIDE 10 MG: 5 INJECTION, SOLUTION INTRAVENOUS at 05:11

## 2022-11-01 RX ADMIN — ACETAMINOPHEN 650 MG: 325 TABLET ORAL at 10:11

## 2022-11-01 RX ADMIN — OXYCODONE HYDROCHLORIDE AND ACETAMINOPHEN 1 TABLET: 5; 325 TABLET ORAL at 06:11

## 2022-11-01 RX ADMIN — SENNOSIDES AND DOCUSATE SODIUM 1 TABLET: 50; 8.6 TABLET ORAL at 09:11
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
## 2022-11-01 NOTE — CONSULTS
Inpatient consult to Physical Medicine Rehab  Consult performed by: Dayami Ruiz NP  Consult ordered by: Shari Coto PA-C    Consult received.  Reviewed patient history and current admission.  PM&R following. Will follow up with pt once medically stable and able to participate with therapies.     Dayami Ruiz NP  Physical Medicine & Rehabilitation   11/01/2022
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
## 2022-11-01 NOTE — CONSULTS
[facility]  Adult Nutrition  Consult Note    SUMMARY     Recommendations    If/when able, ADAT to Regular (texture per SLP).  Please re-consult if unable to advance diet & TFs warranted.  RD to monitor & follow-up.    Goals: Meet % EEN, EPN by RD f/u date  Nutrition Goal Status: new  Communication of RD Recs: reviewed with RN    Assessment and Plan    Nutrition Problem:  Inadequate energy intake    Related to (etiology):   Inability to consume sufficient energy    Signs and Symptoms (as evidenced by):   NPO    Interventions(treatment strategy):  Collaboration of nutrition care w/ other providers  ADAT    Nutrition Diagnosis Status:   New    Reason for Assessment    Reason For Assessment: consult  Diagnosis: other (see comments) (SDH)  Relevant Medical History: HTN  Interdisciplinary Rounds: did not attend    General Information Comments: NPO, passed Daniel - awaiting SLP evaluation. Reports good appetite PTA & stable weight of 175#. Appears thin, however no indicators of malnutrition noted. Will monitor.  Nutrition Discharge Planning: Adequate nutrition    Nutrition/Diet History    Factors Affecting Nutritional Intake: NPO    Anthropometrics    Temp: 99.2 °F (37.3 °C)  Height Method: Stated  Height: 6' (182.9 cm)  Height (inches): 72 in  Weight Method: Bed Scale  Weight: 79.3 kg (174 lb 13.2 oz)  Weight (lb): 174.83 lb  Ideal Body Weight (IBW), Male: 178 lb  % Ideal Body Weight, Male (lb): 98.22 %  BMI (Calculated): 23.7  BMI Grade: 18.5-24.9 - normal    Lab/Procedures/Meds    Pertinent Labs Reviewed: reviewed  Pertinent Medications Reviewed: reviewed  Pertinent Medications Comments: Nicardipine    Estimated/Assessed Needs    Weight Used For Calorie Calculations: 79.3 kg (174 lb 13.2 oz)    Energy Calorie Requirements (kcal): 2026 kcal/d  Energy Need Method: [place] (1.25 PAL)    Protein Requirements: 79 g/d (1 g/kg)  Weight Used For Protein Calculations: 79.3 kg (174 lb 13.2  oz)    Estimated Fluid Requirement Method: other (see comments) (Per MD or 1 mL/kcal)  RDA Method (mL): 2026    Nutrition Prescription Ordered    Current Diet Order: NPO    Evaluation of Received Nutrient/Fluid Intake    I/O: +1.3L since admit    Comments: LBM: 10/31    Nutrition Risk    Level of Risk/Frequency of Follow-up:  (1x/week)     Monitor and Evaluation    Food and Nutrient Intake: energy intake, food and beverage intake  Food and Nutrient Adminstration: diet order  Physical Activity and Function: nutrition-related ADLs and IADLs  Anthropometric Measurements: weight, weight change  Biochemical Data, Medical Tests and Procedures: inflammatory profile, lipid profile, glucose/endocrine profile, gastrointestinal profile, electrolyte and renal panel  Nutrition-Focused Physical Findings: overall appearance     Nutrition Follow-Up    RD Follow-up?: Yes

## 2022-11-01 NOTE — PROGRESS NOTES
I was notified by nursing staff that the patient had been room for over an hour with no staff assigned.  This was because it appeared that there was an MD assigned as the patient came [place] [facility].  Patient is on a Cardene drip.  Once I was notified by nursing staff that this patient did not have an MD assigned, myself and Ana signed up for the patient and went to see him immediately.

## 2022-11-01 NOTE — PLAN OF CARE
[facility] Care Plan    POC reviewed with Jose Morales at 0300. Pt verbalized understanding. Questions and concerns addressed. No acute events overnight. Pt progressing toward goals. Will continue to monitor. See below and flowsheets for full assessment and VS info.           Is this a stroke patient? no    Neuro:  [place] Coma Scale  Best Eye Response: 4-->(E4) spontaneous  Best Motor Response: 6-->(M6) obeys commands  Best Verbal Response: 5-->(V5) oriented  Laura Coma Scale Score: 15  Pupil PERRLA: yes     24hr Temp:  [98 °F (36.7 °C)-98.6 °F (37 °C)]     CV:   Rhythm: normal sinus rhythm  BP goals:   SBP < 160  MAP > 65    Resp:   O2 Device (Oxygen Therapy): room air       Plan: N/A    GI/:        Last Bowel Movement: 10/31/22     No intake or output data in the 24 hours ending 11/01/22 0627       Labs/Accuchecks:  Recent Labs   Lab 11/01/22 0457   WBC 7.82   RBC 4.38*   HGB 14.6   HCT 42.3         Recent Labs   Lab 11/01/22 0457      K 3.6   CO2 25      BUN 12   CREATININE 0.8   ALKPHOS 95   ALT 11   AST 23   BILITOT 0.7      Recent Labs   Lab 11/01/22 0457   INR 1.1   APTT 27.9    No results for input(s): CPK, CPKMB, TROPONINI, MB in the last 168 hours.    Electrolytes: No replacement orders  Accuchecks: none    Gtts:   niCARdipine         LDA/Wounds:  Lines/Drains/Airways       Peripheral Intravenous Line  Duration                  Peripheral IV - Single Lumen 10/31/22 1818 20 G Anterior;Left Forearm <1 day         Peripheral IV - Single Lumen 10/31/22 1818 20 G Left Antecubital <1 day                  Wounds: No  Wound care consulted: No

## 2022-11-01 NOTE — NURSING
Patient arrived to [facility]     Report received from: OMLEANNE, RN    Type of stroke/diagnosis:  Bilateral SDH    Tenecteplase start and end time NA    Thrombectomy start and end time (if applicable): NA    Current symptoms: move everything spontaneously, AOX4, Pupils brisk and reactive, pt deny vision change. Pt c/o shoulder and back pain on the right side.    Skin assessment done: Yes    Wounds noted: scrapes on right hand, small abrasion to forehead/scalp    *If wounds noted, was Wound Care consulted? No    Daniel Completed? pending    Patient Belongings on Admit: Blue silviano, black watch, pair of orange socks, pair black shoes, red boxers, black wallet, cellphone    [facility] notified: Shari PABLO
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
## 2022-11-01 NOTE — PLAN OF CARE
Recommendations     If/when able, ADAT to Regular (texture per SLP).  Please re-consult if unable to advance diet & TFs warranted.  RD to monitor & follow-up.     Goals: Meet % EEN, EPN by RD f/u date  Nutrition Goal Status: new  Communication of RD Recs: reviewed with RN

## 2022-11-01 NOTE — PLAN OF CARE
[facility] Care Plan    POC reviewed with Jose Morales and family at 1400. Pt verbalized understanding. Questions and concerns addressed. No acute events today. Pt progressing toward goals. Will continue to monitor. See below and flowsheets for full assessment and VS info.             Is this a stroke patient? no    Neuro:  [place] Coma Scale  Best Eye Response: 4-->(E4) spontaneous  Best Motor Response: 6-->(M6) obeys commands  Best Verbal Response: 5-->(V5) oriented  [place] Coma Scale Score: 15  Assessment Qualifiers: patient not sedated/intubated  Pupil PERRLA: yes     24 hr Temp:  [98 °F (36.7 °C)-99.2 °F (37.3 °C)]     CV:   Rhythm: normal sinus rhythm  BP goals:   SBP < 140  MAP > 65    Resp:   O2 Device (Oxygen Therapy): room air       Plan: N/A    GI/:     Diet/Nutrition Received: NPO  Last Bowel Movement: 10/31/22       Intake/Output Summary (Last 24 hours) at 11/1/2022 1641  Last data filed at 11/1/2022 1035  Gross per 24 hour   Intake 137.33 ml   Output 450 ml   Net -312.67 ml          Labs/Accuchecks:  Recent Labs   Lab 11/01/22 0457   WBC 7.82   RBC 4.38*   HGB 14.6   HCT 42.3         Recent Labs   Lab 11/01/22 0457      K 3.6   CO2 25      BUN 12   CREATININE 0.8   ALKPHOS 95   ALT 11   AST 23   BILITOT 0.7      Recent Labs   Lab 11/01/22 0457   INR 1.1   APTT 27.9    No results for input(s): CPK, CPKMB, TROPONINI, MB in the last 168 hours.    Electrolytes: No replacement orders  Accuchecks: none    Gtts:   niCARdipine         LDA/Wounds:  Lines/Drains/Airways       Peripheral Intravenous Line  Duration                  Peripheral IV - Single Lumen 10/31/22 1818 20 G Anterior;Left Forearm <1 day         Peripheral IV - Single Lumen 10/31/22 1818 20 G Left Antecubital <1 day                  Wounds: No  Wound care consulted: No   Problem: Adult Inpatient Plan of Care  Goal: Plan of Care Review  Outcome: Ongoing, Progressing  Goal: Patient-Specific Goal (Individualized)  Outcome:  Ongoing, Progressing  Goal: Absence of Hospital-Acquired Illness or Injury  Outcome: Ongoing, Progressing  Goal: Optimal Comfort and Wellbeing  Outcome: Ongoing, Progressing  Goal: Readiness for Transition of Care  Outcome: Ongoing, Progressing

## 2022-11-01 NOTE — PLAN OF CARE
Eval and POC in place  Problem: Occupational Therapy  Goal: Occupational Therapy Goal  Description: Goals to be met by: 12/01/22     Patient will increase functional independence with ADLs by performing:    UE Dressing with [place].  LE Dressing with Set-up Assistance.  Grooming while standing at sink with Supervision.  Toileting from toilet with Stand-by Assistance for hygiene and clothing management.   Toilet transfer to toilet with Stand-by Assistance.    Outcome: Ongoing, Progressing
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
## 2022-11-01 NOTE — ED NOTES
Gave report to Jewell whom will be taking care of this patient in room 9084. No further questions at this time. Will set up transport monitor and this nurse will transport patient to ICU.

## 2022-11-01 NOTE — PLAN OF CARE
Problem: Physical Therapy  Goal: Physical Therapy Goal  Description: PT goals until 11/12/22    1. Pt supine to sit with supervision-not met  2. Pt sit to supine with supervision-not met  3. Pt sit to stand with LRAD as needed for safety with SBA-not met  4. Pt to perform gait 100ft with LRAD as needed for safety with SBA.-not met  5. Pt to transfer bed to/from bedside chair with SBA.-not met  6. Pt to up/down 3 steps with R UE rail with CGA.-not met  7. Pt to perform B LE exs in sitting or supine x 15 reps to strengthen B LE to improve functional mobility.-not met   Outcome: Ongoing, Progressing   Pt's goals set and pt will benefit from skilled PT services to work towards improved functional mobility including: bed mobility, transfers, and gait.   11/1/2022
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
## 2022-11-01 NOTE — PLAN OF CARE
SW Attempted to see patient for assessment and discuss discharge plan ; however patient is asleep on second attempts. SW will follow-up.        Nessa Hutchinson LMSW  PRN - [facility]  [id number]

## 2022-11-01 NOTE — ED NOTES
SPOKE TO C WHO CALLED WITH LOCATION AND NUMBER TO CALL REPORT. PT IS BEING TRANSFERRED TO [facility] ED AND NUMBER TO CALL REPORTS -[phone]. TRANSPORTATION FOR PATIENT HAS BEEN SET UP ALREADY.
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
## 2022-11-01 NOTE — EKG INTERPRETATIONS - EMERGENCY DEPT.
EKG with normal sinus rhythm, rate 69, right bundle-branch block, no STEMI, no prior for comparison.
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
## 2022-11-01 NOTE — ED NOTES
Upon assuming care of this patient at 2300 this evening, patient is aox4, able to answer questions appropriately as well as follow commands. Pt voiced no concerns at this time besides being cold. Gave patient two blankets for comfort. Will continue to monitor/inform patient about status of ED visit. Patient's blood pressure well controlled with cadene at this time.
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
## 2022-11-02 LAB
ALBUMIN SERPL BCP-MCNC: 3.7 G/DL (ref 3.5–5.2)
ALP SERPL-CCNC: 96 U/L (ref 55–135)
ALT SERPL W/O P-5'-P-CCNC: 11 U/L (ref 10–44)
ANION GAP SERPL CALC-SCNC: 10 MMOL/L (ref 8–16)
AST SERPL-CCNC: 20 U/L (ref 10–40)
BASOPHILS # BLD AUTO: 0.02 K/UL (ref 0–0.2)
BASOPHILS NFR BLD: 0.2 % (ref 0–1.9)
BILIRUB SERPL-MCNC: 0.7 MG/DL (ref 0.1–1)
BUN SERPL-MCNC: 12 MG/DL (ref 8–23)
CALCIUM SERPL-MCNC: 9.7 MG/DL (ref 8.7–10.5)
CHLORIDE SERPL-SCNC: 102 MMOL/L (ref 95–110)
CO2 SERPL-SCNC: 24 MMOL/L (ref 23–29)
CREAT SERPL-MCNC: 0.8 MG/DL (ref 0.5–1.4)
DIFFERENTIAL METHOD: ABNORMAL
EOSINOPHIL # BLD AUTO: 0.2 K/UL (ref 0–0.5)
EOSINOPHIL NFR BLD: 2.9 % (ref 0–8)
ERYTHROCYTE [DISTWIDTH] IN BLOOD BY AUTOMATED COUNT: 12.8 % (ref 11.5–14.5)
EST. GFR  (NO RACE VARIABLE): >60 ML/MIN/1.73 M^2
GLUCOSE SERPL-MCNC: 95 MG/DL (ref 70–110)
HCT VFR BLD AUTO: 42.8 % (ref 40–54)
HGB BLD-MCNC: 14.4 G/DL (ref 14–18)
IMM GRANULOCYTES # BLD AUTO: 0.03 K/UL (ref 0–0.04)
IMM GRANULOCYTES NFR BLD AUTO: 0.4 % (ref 0–0.5)
LYMPHOCYTES # BLD AUTO: 1.9 K/UL (ref 1–4.8)
LYMPHOCYTES NFR BLD: 22.3 % (ref 18–48)
MAGNESIUM SERPL-MCNC: 1.9 MG/DL (ref 1.6–2.6)
MCH RBC QN AUTO: 32.4 PG (ref 27–31)
MCHC RBC AUTO-ENTMCNC: 33.6 G/DL (ref 32–36)
MCV RBC AUTO: 96 FL (ref 82–98)
MONOCYTES # BLD AUTO: 0.5 K/UL (ref 0.3–1)
MONOCYTES NFR BLD: 6.5 % (ref 4–15)
NEUTROPHILS # BLD AUTO: 5.7 K/UL (ref 1.8–7.7)
NEUTROPHILS NFR BLD: 67.7 % (ref 38–73)
NRBC BLD-RTO: 0 /100 WBC
PHOSPHATE SERPL-MCNC: 2.6 MG/DL (ref 2.7–4.5)
PLATELET # BLD AUTO: 224 K/UL (ref 150–450)
PMV BLD AUTO: 10.3 FL (ref 9.2–12.9)
POTASSIUM SERPL-SCNC: 3.7 MMOL/L (ref 3.5–5.1)
PROT SERPL-MCNC: 7.8 G/DL (ref 6–8.4)
RBC # BLD AUTO: 4.44 M/UL (ref 4.6–6.2)
SODIUM SERPL-SCNC: 136 MMOL/L (ref 136–145)
WBC # BLD AUTO: 8.35 K/UL (ref 3.9–12.7)

## 2022-11-02 PROCEDURE — 20000000 HC ICU ROOM

## 2022-11-02 PROCEDURE — 63600175 PHARM REV CODE 636 W HCPCS: Performed by: STUDENT IN AN ORGANIZED HEALTH CARE EDUCATION/TRAINING PROGRAM

## 2022-11-02 PROCEDURE — 99232 SBSQ HOSP IP/OBS MODERATE 35: CPT | Mod: ,,, | Performed by: NEUROLOGICAL SURGERY

## 2022-11-02 PROCEDURE — 85025 COMPLETE CBC W/AUTO DIFF WBC: CPT | Performed by: PHYSICIAN ASSISTANT

## 2022-11-02 PROCEDURE — 25500020 PHARM REV CODE 255: Performed by: INTERNAL MEDICINE

## 2022-11-02 PROCEDURE — 25000003 PHARM REV CODE 250: Performed by: STUDENT IN AN ORGANIZED HEALTH CARE EDUCATION/TRAINING PROGRAM

## 2022-11-02 PROCEDURE — 99233 PR SUBSEQUENT HOSPITAL CARE,LEVL III: ICD-10-PCS | Mod: ,,, | Performed by: INTERNAL MEDICINE

## 2022-11-02 PROCEDURE — 99232 PR SUBSEQUENT HOSPITAL CARE,LEVL II: ICD-10-PCS | Mod: ,,, | Performed by: NEUROLOGICAL SURGERY

## 2022-11-02 PROCEDURE — 83735 ASSAY OF MAGNESIUM: CPT | Performed by: PSYCHIATRY & NEUROLOGY

## 2022-11-02 PROCEDURE — 63600175 PHARM REV CODE 636 W HCPCS

## 2022-11-02 PROCEDURE — 80053 COMPREHEN METABOLIC PANEL: CPT | Performed by: PSYCHIATRY & NEUROLOGY

## 2022-11-02 PROCEDURE — 94761 N-INVAS EAR/PLS OXIMETRY MLT: CPT

## 2022-11-02 PROCEDURE — 25000003 PHARM REV CODE 250: Performed by: PHYSICIAN ASSISTANT

## 2022-11-02 PROCEDURE — 25000003 PHARM REV CODE 250

## 2022-11-02 PROCEDURE — 63600175 PHARM REV CODE 636 W HCPCS: Performed by: INTERNAL MEDICINE

## 2022-11-02 PROCEDURE — 84100 ASSAY OF PHOSPHORUS: CPT | Performed by: PSYCHIATRY & NEUROLOGY

## 2022-11-02 PROCEDURE — 99233 SBSQ HOSP IP/OBS HIGH 50: CPT | Mod: ,,, | Performed by: INTERNAL MEDICINE

## 2022-11-02 RX ORDER — IODIXANOL 320 MG/ML
200 INJECTION, SOLUTION INTRAVASCULAR
Status: COMPLETED | OUTPATIENT
Start: 2022-11-02 | End: 2022-11-02

## 2022-11-02 RX ORDER — AMLODIPINE BESYLATE 10 MG/1
10 TABLET ORAL DAILY
Status: DISCONTINUED | OUTPATIENT
Start: 2022-11-03 | End: 2022-11-03 | Stop reason: HOSPADM

## 2022-11-02 RX ORDER — AMLODIPINE BESYLATE 5 MG/1
5 TABLET ORAL ONCE
Status: COMPLETED | OUTPATIENT
Start: 2022-11-02 | End: 2022-11-02

## 2022-11-02 RX ORDER — HYDRALAZINE HYDROCHLORIDE 20 MG/ML
INJECTION INTRAMUSCULAR; INTRAVENOUS
Status: DISPENSED
Start: 2022-11-02 | End: 2022-11-02

## 2022-11-02 RX ORDER — MIDAZOLAM HYDROCHLORIDE 1 MG/ML
INJECTION INTRAMUSCULAR; INTRAVENOUS
Status: COMPLETED | OUTPATIENT
Start: 2022-11-02 | End: 2022-11-02

## 2022-11-02 RX ORDER — HYDRALAZINE HYDROCHLORIDE 20 MG/ML
20 INJECTION INTRAMUSCULAR; INTRAVENOUS ONCE
Status: COMPLETED | OUTPATIENT
Start: 2022-11-02 | End: 2022-11-02

## 2022-11-02 RX ORDER — FENTANYL CITRATE 50 UG/ML
INJECTION, SOLUTION INTRAMUSCULAR; INTRAVENOUS
Status: COMPLETED | OUTPATIENT
Start: 2022-11-02 | End: 2022-11-02

## 2022-11-02 RX ORDER — SODIUM CHLORIDE 0.9 % (FLUSH) 0.9 %
10 SYRINGE (ML) INJECTION
Status: DISCONTINUED | OUTPATIENT
Start: 2022-11-02 | End: 2022-11-03 | Stop reason: HOSPADM

## 2022-11-02 RX ADMIN — AMLODIPINE BESYLATE 5 MG: 5 TABLET ORAL at 09:11

## 2022-11-02 RX ADMIN — OXYCODONE HYDROCHLORIDE AND ACETAMINOPHEN 1 TABLET: 5; 325 TABLET ORAL at 07:11

## 2022-11-02 RX ADMIN — FENTANYL CITRATE 25 MCG: 50 INJECTION, SOLUTION INTRAMUSCULAR; INTRAVENOUS at 10:11

## 2022-11-02 RX ADMIN — LABETALOL HYDROCHLORIDE 10 MG: 5 INJECTION, SOLUTION INTRAVENOUS at 05:11

## 2022-11-02 RX ADMIN — MIDAZOLAM HYDROCHLORIDE 0.5 MG: 1 INJECTION INTRAMUSCULAR; INTRAVENOUS at 11:11

## 2022-11-02 RX ADMIN — LEVETIRACETAM 500 MG: 100 INJECTION, SOLUTION INTRAVENOUS at 09:11

## 2022-11-02 RX ADMIN — LEVETIRACETAM 500 MG: 100 INJECTION, SOLUTION INTRAVENOUS at 08:11

## 2022-11-02 RX ADMIN — AMLODIPINE BESYLATE 5 MG: 5 TABLET ORAL at 08:11

## 2022-11-02 RX ADMIN — FENTANYL CITRATE 25 MCG: 50 INJECTION, SOLUTION INTRAMUSCULAR; INTRAVENOUS at 11:11

## 2022-11-02 RX ADMIN — FENTANYL CITRATE 50 MCG: 50 INJECTION, SOLUTION INTRAMUSCULAR; INTRAVENOUS at 10:11

## 2022-11-02 RX ADMIN — OXYCODONE HYDROCHLORIDE AND ACETAMINOPHEN 1 TABLET: 5; 325 TABLET ORAL at 11:11

## 2022-11-02 RX ADMIN — IODIXANOL 85 ML: 320 INJECTION, SOLUTION INTRAVASCULAR at 11:11

## 2022-11-02 RX ADMIN — MIDAZOLAM HYDROCHLORIDE 1 MG: 1 INJECTION INTRAMUSCULAR; INTRAVENOUS at 10:11

## 2022-11-02 RX ADMIN — HYDRALAZINE HYDROCHLORIDE 20 MG: 20 INJECTION, SOLUTION INTRAMUSCULAR; INTRAVENOUS at 11:11

## 2022-11-02 RX ADMIN — SENNOSIDES AND DOCUSATE SODIUM 1 TABLET: 50; 8.6 TABLET ORAL at 09:11

## 2022-11-02 RX ADMIN — LABETALOL HYDROCHLORIDE 10 MG: 5 INJECTION, SOLUTION INTRAVENOUS at 07:11
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
## 2022-11-02 NOTE — SEDATION DOCUMENTATION
Hemostasis achieved via right groin with use of ExoSeal closure device 1127 Pt to lay flat for 2 hours until 1327

## 2022-11-02 NOTE — PLAN OF CARE
[facility] Care Plan    POC reviewed with Jose Morales at 0300. Pt verbalized understanding. Questions and concerns addressed. No acute events overnight. Pt progressing toward goals. Will continue to monitor. See below and flowsheets for full assessment and VS info.           Is this a stroke patient? no    Neuro:  [place] Coma Scale  Best Eye Response: 4-->(E4) spontaneous  Best Motor Response: 6-->(M6) obeys commands  Best Verbal Response: 5-->(V5) oriented  Laura Coma Scale Score: 15  Assessment Qualifiers: patient not sedated/intubated  Pupil PERRLA: yes     24hr Temp:  [98.3 °F (36.8 °C)-99.2 °F (37.3 °C)]     CV:   Rhythm: normal sinus rhythm  BP goals:   SBP < 140  MAP > 65    Resp:   O2 Device (Oxygen Therapy): room air       Plan: N/A    GI/:     Diet/Nutrition Received: regular  Last Bowel Movement: 10/31/22       Intake/Output Summary (Last 24 hours) at 11/2/2022 0624  Last data filed at 11/2/2022 0059  Gross per 24 hour   Intake 137.33 ml   Output 1250 ml   Net -1112.67 ml          Labs/Accuchecks:  Recent Labs   Lab 11/02/22  0543   WBC 8.35   RBC 4.44*   HGB 14.4   HCT 42.8         Recent Labs   Lab 11/01/22  0457      K 3.6   CO2 25      BUN 12   CREATININE 0.8   ALKPHOS 95   ALT 11   AST 23   BILITOT 0.7      Recent Labs   Lab 11/01/22  0457   INR 1.1   APTT 27.9    No results for input(s): CPK, CPKMB, TROPONINI, MB in the last 168 hours.    Electrolytes: N/A - electrolytes WDL  Accuchecks: none    Gtts:   niCARdipine         LDA/Wounds:  Lines/Drains/Airways       Peripheral Intravenous Line  Duration                  Peripheral IV - Single Lumen 10/31/22 1818 20 G Anterior;Left Forearm 1 day         Peripheral IV - Single Lumen 10/31/22 1818 20 G Left Antecubital 1 day                  Wounds: No  Wound care consulted: No
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
## 2022-11-02 NOTE — SUBJECTIVE & OBJECTIVE
Interval History:     NAEON. No issues per nursing. Exam stable. Repeat Head CT unchanged. KAYLIE embo today          Medications:  Continuous Infusions:   niCARdipine       Scheduled Meds:   [START ON 11/3/2022] amLODIPine  10 mg Oral Daily    hydrALAZINE        levetiracetam IV  500 mg Intravenous Q12H    senna-docusate 8.6-50 mg  1 tablet Oral BID     PRN Meds:acetaminophen, labetalol, ondansetron, oxyCODONE-acetaminophen, sodium chloride 0.9%, sodium chloride 0.9%     Review of Systems  ROS negative ow  Objective:     Weight: 79.3 kg (174 lb 13.2 oz)  Body mass index is 23.71 kg/m².  Vital Signs (Most Recent):  Temp: 98.6 °F (37 °C) (11/02/22 1500)  Pulse: 86 (11/02/22 1700)  Resp: 15 (11/02/22 1700)  BP: (!) 153/72 (11/02/22 1700)  SpO2: 100 % (11/02/22 1700)   Vital Signs (24h Range):  Temp:  [98.6 °F (37 °C)-98.9 °F (37.2 °C)] 98.6 °F (37 °C)  Pulse:  [52-94] 86  Resp:  [13-18] 15  SpO2:  [97 %-100 %] 100 %  BP: (122-168)/(58-84) 153/72     Date 11/02/22 0700 - 11/03/22 0659   Shift 3948-9142 6453-9735 1968-1303 24 Hour Total   INTAKE   P.O. 300 300  600   I.V.(mL/kg) 67.9(0.9)   67.9(0.9)   Shift Total(mL/kg) 367.9(4.6) 300(3.8)  667.9(8.4)   OUTPUT   Urine(mL/kg/hr) 400(0.6) 400  800   Shift Total(mL/kg) 400(5) 400(5)  800(10.1)   Weight (kg) 79.3 79.3 79.3 79.3                        Physical Exam  General: appears well-developed and well-nourished, no distress  Eyes: EOMI, PERRLA  Cardiovascular: RRR  Pulm: NWOB, no distress   Abdominal: soft  MSK: moves all extremities spontaneously with good tone.R shoulder limited   Neurological: AAOX3, no drift, GCS E4V5M6, CN II-XII grossly intact and face symm, normal speech, following simple commands, JORGENSEN, full strength throughout except R shoulder limited 2/2 pain   Neck: no C spine tenderness     Significant Labs:  Recent Labs   Lab 10/31/22  2134 11/01/22  0457 11/02/22  0643   GLU 95 97 95    138 136   K 4.1 3.6 3.7    105 102   CO2 28 25 24   BUN 13  12 12   CREATININE 0.8 0.8 0.8   CALCIUM 10.2 9.7 9.7   MG  --  1.8 1.9     Recent Labs   Lab 10/31/22  2134 11/01/22  0457 11/02/22  0543   WBC 8.40 7.82 8.35   HGB 14.6 14.6 14.4   HCT 43.0 42.3 42.8    218 224     Recent Labs   Lab 10/31/22  2134 11/01/22  0457   INR 1.1 1.1   APTT 27.7 27.9     Microbiology Results (last 7 days)       ** No results found for the last 168 hours. **          Recent Lab Results         11/02/22  0643   11/02/22  0543        Albumin 3.7         Alkaline Phosphatase 96         ALT 11         Anion Gap 10         AST 20         Baso #   0.02       Basophil %   0.2       BILIRUBIN TOTAL 0.7  Comment: For infants and newborns, interpretation of results should be based  on gestational age, weight and in agreement with clinical  observations.    Premature Infant recommended reference ranges:  Up to 24 hours.............<8.0 mg/dL  Up to 48 hours............<12.0 mg/dL  3-5 days..................<15.0 mg/dL  6-29 days.................<15.0 mg/dL           BUN 12         Calcium 9.7         Chloride 102         CO2 24         Creatinine 0.8         Differential Method   Automated       eGFR >60.0         Eos #   0.2       Eosinophil %   2.9       Glucose 95         Gran # (ANC)   5.7       Gran %   67.7       Hematocrit   42.8       Hemoglobin   14.4       Immature Grans (Abs)   0.03  Comment: Mild elevation in immature granulocytes is non specific and   can be seen in a variety of conditions including stress response,   acute inflammation, trauma and pregnancy. Correlation with other   laboratory and clinical findings is essential.         Immature Granulocytes   0.4       Lymph #   1.9       Lymph %   22.3       Magnesium 1.9         MCH   32.4       MCHC   33.6       MCV   96       Mono #   0.5       Mono %   6.5       MPV   10.3       nRBC   0       Phosphorus 2.6         Platelets   224       Potassium 3.7         PROTEIN TOTAL 7.8         RBC   4.44       RDW   12.8        Sodium 136         WBC   8.35               Significant Diagnostics:  CT: No results found in the last 24 hours.
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
## 2022-11-02 NOTE — SUBJECTIVE & OBJECTIVE
Interval History: Refer to hospital course above    Review of Systems   Constitutional:  Positive for fatigue. Negative for chills and fever.   HENT:  Negative for congestion.    Eyes:  Negative for visual disturbance.   Respiratory:  Negative for cough and shortness of breath.    Cardiovascular:  Negative for chest pain and leg swelling.   Gastrointestinal:  Negative for abdominal distention and abdominal pain.   Musculoskeletal:  Positive for arthralgias (R shoulder/wrist; XR normal).   Skin:  Negative for color change.   Neurological:  Negative for dizziness and headaches.   Psychiatric/Behavioral:  Negative for agitation and behavioral problems.      Objective:     Vitals:  Temp: 99.2 °F (37.3 °C)  Pulse: 68  Rhythm: normal sinus rhythm  BP: (!) 141/72  MAP (mmHg): 99  Resp: 16  SpO2: 98 %  O2 Device (Oxygen Therapy): room air    Temp  Min: 98 °F (36.7 °C)  Max: 99.2 °F (37.3 °C)  Pulse  Min: 57  Max: 82  BP  Min: 125/61  Max: 159/85  MAP (mmHg)  Min: 86  Max: 115  Resp  Min: 14  Max: 20  SpO2  Min: 96 %  Max: 100 %    10/31 0701 - 11/01 0700  In: 137.3 [I.V.:137.3]  Out: -            Physical Exam  Constitutional:       Appearance: Normal appearance. He is normal weight.   HENT:      Head: Normocephalic and atraumatic.      Nose: Nose normal.      Mouth/Throat:      Mouth: Mucous membranes are moist.      Pharynx: Oropharynx is clear.   Eyes:      Extraocular Movements: Extraocular movements intact.      Conjunctiva/sclera: Conjunctivae normal.      Pupils: Pupils are equal, round, and reactive to light.   Cardiovascular:      Rate and Rhythm: Normal rate and regular rhythm.      Heart sounds: Normal heart sounds.   Pulmonary:      Effort: Pulmonary effort is normal.      Breath sounds: Normal breath sounds.   Abdominal:      General: Abdomen is flat. Bowel sounds are normal.      Palpations: Abdomen is soft.   Musculoskeletal:      Cervical back: Neck supple.      Right lower leg: No edema.      Left lower  leg: No edema.   Skin:     General: Skin is warm and dry.      Capillary Refill: Capillary refill takes less than 2 seconds.      Comments: Scattered bruising and scabs; healing appropriately   Neurological:      General: No focal deficit present.      Mental Status: He is alert and oriented to person, place, and time. Mental status is at baseline.      Cranial Nerves: Cranial nerves 2-12 are intact.      Sensory: Sensation is intact.      Motor: Motor function is intact.   Psychiatric:         Mood and Affect: Mood normal.         Behavior: Behavior normal.         Medications:  ContinuousniCARdipine    ScheduledamLODIPine, 5 mg, Daily  levetiracetam IV, 500 mg, Q12H  senna-docusate 8.6-50 mg, 1 tablet, BID    PRNacetaminophen, 650 mg, Q6H PRN  labetalol, 10 mg, Q6H PRN  ondansetron, 4 mg, Q8H PRN  oxyCODONE-acetaminophen, 1 tablet, Q4H PRN  sodium chloride 0.9%, 10 mL, PRN        Diet  Diet Adult Regular (IDDSI Level 7)  Diet NPO- midnight for IR procedure tomorrow

## 2022-11-02 NOTE — ASSESSMENT & PLAN NOTE
A 62 year old male with bilateral holohemispheric mixed density SDH, 1.0 cm on L, 1.1 cm on R, with some local mass effect, no MLS. Neuro intact.      Continue ICU care   Neurocheck Q1hr  Repeat Head CT unchanged  Bilateral KAYLIE embo today. IR consulted   Keep NPO  Coags wnl, not on AC/AP  Keppra 500 BID x1 week  SBP < 150  Sating well at RA  PT/OT  NSGY will continue to follow, remainder of care per NCC
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
## 2022-11-03 VITALS
SYSTOLIC BLOOD PRESSURE: 131 MMHG | HEART RATE: 74 BPM | OXYGEN SATURATION: 100 % | RESPIRATION RATE: 14 BRPM | BODY MASS INDEX: 23.68 KG/M2 | TEMPERATURE: 98 F | HEIGHT: 72 IN | WEIGHT: 174.81 LBS | DIASTOLIC BLOOD PRESSURE: 72 MMHG

## 2022-11-03 LAB
ALBUMIN SERPL BCP-MCNC: 3.6 G/DL (ref 3.5–5.2)
ALP SERPL-CCNC: 95 U/L (ref 55–135)
ALT SERPL W/O P-5'-P-CCNC: 11 U/L (ref 10–44)
ANION GAP SERPL CALC-SCNC: 11 MMOL/L (ref 8–16)
AST SERPL-CCNC: 19 U/L (ref 10–40)
BASOPHILS # BLD AUTO: 0.02 K/UL (ref 0–0.2)
BASOPHILS NFR BLD: 0.2 % (ref 0–1.9)
BILIRUB SERPL-MCNC: 0.7 MG/DL (ref 0.1–1)
BUN SERPL-MCNC: 13 MG/DL (ref 8–23)
CALCIUM SERPL-MCNC: 9.4 MG/DL (ref 8.7–10.5)
CHLORIDE SERPL-SCNC: 106 MMOL/L (ref 95–110)
CO2 SERPL-SCNC: 21 MMOL/L (ref 23–29)
CREAT SERPL-MCNC: 0.7 MG/DL (ref 0.5–1.4)
DIFFERENTIAL METHOD: ABNORMAL
EOSINOPHIL # BLD AUTO: 0.2 K/UL (ref 0–0.5)
EOSINOPHIL NFR BLD: 2.5 % (ref 0–8)
ERYTHROCYTE [DISTWIDTH] IN BLOOD BY AUTOMATED COUNT: 12.8 % (ref 11.5–14.5)
EST. GFR  (NO RACE VARIABLE): >60 ML/MIN/1.73 M^2
GLUCOSE SERPL-MCNC: 101 MG/DL (ref 70–110)
HCT VFR BLD AUTO: 41.9 % (ref 40–54)
HGB BLD-MCNC: 14.3 G/DL (ref 14–18)
IMM GRANULOCYTES # BLD AUTO: 0.02 K/UL (ref 0–0.04)
IMM GRANULOCYTES NFR BLD AUTO: 0.2 % (ref 0–0.5)
LYMPHOCYTES # BLD AUTO: 2.3 K/UL (ref 1–4.8)
LYMPHOCYTES NFR BLD: 25.1 % (ref 18–48)
MAGNESIUM SERPL-MCNC: 1.9 MG/DL (ref 1.6–2.6)
MCH RBC QN AUTO: 32.4 PG (ref 27–31)
MCHC RBC AUTO-ENTMCNC: 34.1 G/DL (ref 32–36)
MCV RBC AUTO: 95 FL (ref 82–98)
MONOCYTES # BLD AUTO: 0.6 K/UL (ref 0.3–1)
MONOCYTES NFR BLD: 6.3 % (ref 4–15)
NEUTROPHILS # BLD AUTO: 6 K/UL (ref 1.8–7.7)
NEUTROPHILS NFR BLD: 65.7 % (ref 38–73)
NRBC BLD-RTO: 0 /100 WBC
PHOSPHATE SERPL-MCNC: 2.9 MG/DL (ref 2.7–4.5)
PLATELET # BLD AUTO: 225 K/UL (ref 150–450)
PMV BLD AUTO: 9.7 FL (ref 9.2–12.9)
POTASSIUM SERPL-SCNC: 3.5 MMOL/L (ref 3.5–5.1)
PROT SERPL-MCNC: 7.6 G/DL (ref 6–8.4)
RBC # BLD AUTO: 4.41 M/UL (ref 4.6–6.2)
SODIUM SERPL-SCNC: 138 MMOL/L (ref 136–145)
WBC # BLD AUTO: 9.11 K/UL (ref 3.9–12.7)

## 2022-11-03 PROCEDURE — 85025 COMPLETE CBC W/AUTO DIFF WBC: CPT | Performed by: PHYSICIAN ASSISTANT

## 2022-11-03 PROCEDURE — 25000003 PHARM REV CODE 250: Performed by: PHYSICIAN ASSISTANT

## 2022-11-03 PROCEDURE — 99233 PR SUBSEQUENT HOSPITAL CARE,LEVL III: ICD-10-PCS | Mod: ,,, | Performed by: PSYCHIATRY & NEUROLOGY

## 2022-11-03 PROCEDURE — 94761 N-INVAS EAR/PLS OXIMETRY MLT: CPT

## 2022-11-03 PROCEDURE — 97116 GAIT TRAINING THERAPY: CPT

## 2022-11-03 PROCEDURE — 99232 PR SUBSEQUENT HOSPITAL CARE,LEVL II: ICD-10-PCS | Mod: ,,, | Performed by: NEUROLOGICAL SURGERY

## 2022-11-03 PROCEDURE — 84100 ASSAY OF PHOSPHORUS: CPT | Performed by: PHYSICIAN ASSISTANT

## 2022-11-03 PROCEDURE — 97110 THERAPEUTIC EXERCISES: CPT

## 2022-11-03 PROCEDURE — 80053 COMPREHEN METABOLIC PANEL: CPT | Performed by: PHYSICIAN ASSISTANT

## 2022-11-03 PROCEDURE — 36415 COLL VENOUS BLD VENIPUNCTURE: CPT | Performed by: PHYSICIAN ASSISTANT

## 2022-11-03 PROCEDURE — 83735 ASSAY OF MAGNESIUM: CPT | Performed by: PHYSICIAN ASSISTANT

## 2022-11-03 PROCEDURE — 25000003 PHARM REV CODE 250: Performed by: STUDENT IN AN ORGANIZED HEALTH CARE EDUCATION/TRAINING PROGRAM

## 2022-11-03 PROCEDURE — 63600175 PHARM REV CODE 636 W HCPCS

## 2022-11-03 PROCEDURE — 99232 SBSQ HOSP IP/OBS MODERATE 35: CPT | Mod: ,,, | Performed by: NEUROLOGICAL SURGERY

## 2022-11-03 PROCEDURE — 25000003 PHARM REV CODE 250

## 2022-11-03 PROCEDURE — 99233 SBSQ HOSP IP/OBS HIGH 50: CPT | Mod: ,,, | Performed by: PSYCHIATRY & NEUROLOGY

## 2022-11-03 RX ORDER — LEVETIRACETAM 500 MG/1
500 TABLET ORAL 2 TIMES DAILY
Qty: 12 TABLET | Refills: 0 | Status: SHIPPED | OUTPATIENT
Start: 2022-11-02 | End: 2022-11-08

## 2022-11-03 RX ADMIN — LEVETIRACETAM 500 MG: 100 INJECTION, SOLUTION INTRAVENOUS at 09:11

## 2022-11-03 RX ADMIN — OXYCODONE HYDROCHLORIDE AND ACETAMINOPHEN 1 TABLET: 5; 325 TABLET ORAL at 09:11

## 2022-11-03 RX ADMIN — SENNOSIDES AND DOCUSATE SODIUM 1 TABLET: 50; 8.6 TABLET ORAL at 09:11

## 2022-11-03 RX ADMIN — LABETALOL HYDROCHLORIDE 10 MG: 5 INJECTION, SOLUTION INTRAVENOUS at 05:11

## 2022-11-03 RX ADMIN — AMLODIPINE BESYLATE 10 MG: 10 TABLET ORAL at 08:11

## 2022-11-03 NOTE — PLAN OF CARE
[facility] Care Plan    POC reviewed with Jose Morales and family at 0300. Pt verbalized understanding. Questions and concerns addressed. No acute events overnight. Pt progressing toward goals. Will continue to monitor. See below and flowsheets for full assessment and VS info.   -neuro exam unchanged   -labetalol given x1 for SBP's <140   -CT today     Is this a stroke patient? no    Neuro:  Laura Coma Scale  Best Eye Response: 4-->(E4) spontaneous  Best Motor Response: 6-->(M6) obeys commands  Best Verbal Response: 5-->(V5) oriented  Anton Coma Scale Score: 15  Assessment Qualifiers: patient not sedated/intubated, no eye obstruction present  Pupil PERRLA: yes     24hr Temp:  [98 °F (36.7 °C)-98.8 °F (37.1 °C)]     CV:   Rhythm: normal sinus rhythm  BP goals:   SBP <  200  MAP > 65    Resp:   O2 Device (Oxygen Therapy): room air       Plan: N/A    GI/:     Diet/Nutrition Received: regular  Last Bowel Movement: 10/31/22       Intake/Output Summary (Last 24 hours) at 11/3/2022 0539  Last data filed at 11/2/2022 1705  Gross per 24 hour   Intake 667.91 ml   Output 800 ml   Net -132.09 ml          Labs/Accuchecks:  Recent Labs   Lab 11/03/22  0311   WBC 9.11   RBC 4.41*   HGB 14.3   HCT 41.9         Recent Labs   Lab 11/03/22  0311      K 3.5   CO2 21*      BUN 13   CREATININE 0.7   ALKPHOS 95   ALT 11   AST 19   BILITOT 0.7      Recent Labs   Lab 11/01/22  0457   INR 1.1   APTT 27.9    No results for input(s): CPK, CPKMB, TROPONINI, MB in the last 168 hours.    Electrolytes: N/A - electrolytes WDL  Accuchecks: none    Gtts:   niCARdipine         LDA/Wounds:  Lines/Drains/Airways       Peripheral Intravenous Line  Duration                  Peripheral IV - Single Lumen 10/31/22 1818 20 G Anterior;Left Forearm 2 days         Peripheral IV - Single Lumen 10/31/22 1818 20 G Left Antecubital 2 days                  Wounds: Yes  Wound care consulted: No     Problem: Adult Inpatient Plan of Care  Goal:  Plan of Care Review  Flowsheets (Taken 11/3/2022 0539)  Plan of Care Reviewed With: patient     Problem: Cerebral Tissue Perfusion (Stroke, Hemorrhagic)  Goal: Optimal Cerebral Tissue Perfusion  Intervention: Protect and Optimize Cerebral Perfusion  Flowsheets (Taken 11/3/2022 0539)  Sensory Stimulation Regulation:   care clustered   lighting decreased   quiet environment promoted  Cerebral Perfusion Promotion: blood pressure monitored  Fluid/Electrolyte Management: fluids provided  Stabilization Measures: airway opened     Problem: Communication Impairment (Stroke, Hemorrhagic)  Goal: Effective Communication Skills  Intervention: Optimize Communication Skills  Flowsheets (Taken 11/3/2022 0539)  Communication Enhancement Strategies: call light answered in person
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
## 2022-11-03 NOTE — PLAN OF CARE
[facility] - Neuro Critical Care  Discharge Final Note    Primary Care Provider: Primary Doctor No    Expected Discharge Date: 11/3/2022    Pt therapy recs changed to OP per PT. MD team to dc Pt home. Pt has ride with sister. MD team provided script. Pt does not have a primary care MD, found clinic that would accept Medicaid with the [facility] in [place]. Sent requested information and scheduled appt for hospital follow up.    SW advised by RN that Pt family is unable to get the Pt. SW set up Lyft ride for 40.99 for  today.    Final Discharge Note (most recent)       Final Note - 11/03/22 1431          Final Note    Assessment Type Final Discharge Note     Anticipated Discharge Disposition Home or Self Care     What phone number can be called within the next 1-3 days to see how you are doing after discharge? [phone]     Hospital Resources/Appts/Education Provided Appointments scheduled and added to AVS        Post-Acute Status    Post-Acute Authorization Placement;Other     Post-Acute Placement Status Discharge Plan Changed     Other Status See Comments   dc with OP therapy script    Discharge Delays None known at this time                     Important Message from Medicare             Contact Info       [facility]    [address] [facility] [place] [address]  [phone]       Next Steps: Go on 11/9/2022    Instructions: at 1pm          Madeline Haas LCSW  Neurocritical Care   [facility]  [id number]

## 2022-11-03 NOTE — SUBJECTIVE & OBJECTIVE
Interval History: Refer to hospital course above    Review of Systems   Constitutional:  Negative for chills and fever.   HENT:  Negative for congestion.    Eyes:  Negative for visual disturbance.   Respiratory:  Negative for shortness of breath.    Cardiovascular:  Negative for chest pain and leg swelling.   Gastrointestinal:  Negative for abdominal distention and abdominal pain.   Musculoskeletal:  Positive for arthralgias (stable R shoulder pain).   Skin:  Negative for color change.   Neurological:  Positive for weakness (RUE weakness; d/t shoulder injury). Negative for dizziness, tremors, seizures, syncope, facial asymmetry, speech difficulty, light-headedness, numbness and headaches.   Psychiatric/Behavioral:  Negative for agitation and behavioral problems.      Objective:     Vitals:  Temp: 98.5 °F (36.9 °C)  Pulse: 70  Rhythm: normal sinus rhythm  BP: 126/65  MAP (mmHg): 90  Resp: 14  SpO2: 100 %  O2 Device (Oxygen Therapy): room air    Temp  Min: 98 °F (36.7 °C)  Max: 98.7 °F (37.1 °C)  Pulse  Min: 55  Max: 94  BP  Min: 116/65  Max: 168/74  MAP (mmHg)  Min: 86  Max: 118  Resp  Min: 14  Max: 18  SpO2  Min: 96 %  Max: 100 %    11/02 0701 - 11/03 0700  In: 600 [P.O.:600]  Out: 800 [Urine:800]   Unmeasured Output  Urine Occurrence: 1  Stool Occurrence: 1       Physical Exam  Vitals and nursing note reviewed.   Constitutional:       Appearance: Normal appearance. He is normal weight.   HENT:      Head: Normocephalic and atraumatic.      Right Ear: External ear normal.      Left Ear: External ear normal.      Nose: Nose normal.      Mouth/Throat:      Mouth: Mucous membranes are moist.      Pharynx: Oropharynx is clear.   Eyes:      Extraocular Movements: Extraocular movements intact.      Conjunctiva/sclera: Conjunctivae normal.      Pupils: Pupils are equal, round, and reactive to light.   Cardiovascular:      Rate and Rhythm: Normal rate and regular rhythm.      Pulses: Normal pulses.      Heart sounds: Normal  heart sounds.   Pulmonary:      Effort: Pulmonary effort is normal.      Breath sounds: Normal breath sounds.   Abdominal:      General: Abdomen is flat. Bowel sounds are normal.      Palpations: Abdomen is soft.   Musculoskeletal:      Cervical back: Neck supple.      Right lower leg: No edema.      Left lower leg: No edema.   Skin:     General: Skin is warm and dry.      Capillary Refill: Capillary refill takes less than 2 seconds.   Neurological:      Mental Status: He is alert and oriented to person, place, and time.      Motor: Weakness present.   Psychiatric:         Mood and Affect: Mood normal.         Behavior: Behavior normal.         Medications:  Continuous ScheduledamLODIPine, 10 mg, Daily  levetiracetam IV, 500 mg, Q12H  senna-docusate 8.6-50 mg, 1 tablet, BID    PRNacetaminophen, 650 mg, Q6H PRN  labetalol, 10 mg, Q6H PRN  ondansetron, 4 mg, Q8H PRN  oxyCODONE-acetaminophen, 1 tablet, Q4H PRN  sodium chloride 0.9%, 10 mL, PRN  sodium chloride 0.9%, 10 mL, PRN        Diet  Diet Adult Regular (IDDSI Level 7)
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
## 2022-11-03 NOTE — PLAN OF CARE
[facility] Neuro [facility]      HOME HEALTH ORDERS  FACE TO FACE ENCOUNTER    Patient Name: Jose Morales  YOB: 1960    PCP: Primary Doctor No   PCP Address: None  PCP Phone Number: None  PCP Fax: None    Encounter Date: 10/31/22    Admit to Home Health    Diagnoses:  Active Hospital Problems    Diagnosis  POA    *SDH (subdural hematoma) [S06.5XAA]  Yes    Brain compression [G93.5]  Yes    Opioid use [F11.90]  Yes    Essential hypertension [I10]  Yes    Tobacco abuse [Z72.0]  Yes      Resolved Hospital Problems   No resolved problems to display.       Follow Up Appointments:  Future Appointments   Date Time Provider Department Center   11/22/2022 10:00 AM [facility]       Allergies:Review of patient's allergies indicates:  No Known Allergies    Medications: Review discharge medications with patient and family and provide education.    Current Facility-Administered Medications   Medication Dose Route Frequency Provider Last Rate Last Admin    acetaminophen tablet 650 mg  650 mg Oral Q6H PRN Shari Coto PA-C   650 mg at 11/01/22 1030    amLODIPine tablet 10 mg  10 mg Oral Daily Diony Lyn MD   10 mg at 11/03/22 0859    labetalol 20 mg/4 mL (5 mg/mL) IV syring  10 mg Intravenous Q6H PRN Shari Coto PA-C   10 mg at 11/03/22 0534    levETIRAcetam injection 500 mg  500 mg Intravenous Q12H Caprice Proctor MD   500 mg at 11/03/22 0900    ondansetron injection 4 mg  4 mg Intravenous Q8H PRN Shari Coto PA-C        oxyCODONE-acetaminophen 5-325 mg per tablet 1 tablet  1 tablet Oral Q4H PRN Caprice Proctor MD   1 tablet at 11/03/22 0907    senna-docusate 8.6-50 mg per tablet 1 tablet  1 tablet Oral BID Shari Coto PA-C   1 tablet at 11/03/22 0900    sodium chloride 0.9% flush 10 mL  10 mL Intravenous PRN Shari Coto PA-C        sodium chloride 0.9% flush 10 mL  10 mL Intravenous PRN Kristal Flores MD          Current Discharge Medication List        CONTINUE these medications which have NOT CHANGED    Details   acetaminophen (TYLENOL) 500 MG tablet Take 2 tablets (1,000 mg total) by mouth every 6 (six) hours as needed for Pain.  Qty: 20 tablet, Refills: 0      clonazePAM (KLONOPIN) 1 MG tablet 1 tab PO QD prn  Qty: 15 tablet, Refills: 0    Comments: Medically necessary. Quantity > 7 days  Associated Diagnoses: Anxiety      gabapentin (NEURONTIN) 600 MG tablet Take 1 tablet (600 mg total) by mouth 3 (three) times daily. for 7 days  Qty: 21 tablet, Refills: 0    Associated Diagnoses: Bilateral low back pain with sciatica, sciatica laterality unspecified, unspecified chronicity      LIDOcaine (LIDODERM) 5 % Place 1 patch onto the skin once daily. Remove & Discard patch within 12 hours or as directed by MD. May use 4% formulation if more affordable for patient.  Qty: 6 patch, Refills: 0      losartan (COZAAR) 25 MG tablet Take 1 tablet (25 mg total) by mouth once daily.  Qty: 90 tablet, Refills: 1    Comments: .  Associated Diagnoses: Benign hypertensive heart disease without heart failure      naloxone (NARCAN) 4 mg/actuation Spry 4mg by nasal route as needed for opioid overdose; may repeat every 2-3 minutes in alternating nostrils until medical help arrives. Call 911  Qty: 1 each, Refills: 11      naproxen (NAPROSYN) 500 MG tablet Take 1 tablet (500 mg total) by mouth 2 (two) times daily as needed (Pain).  Qty: 20 tablet, Refills: 0      olopatadine (PATANOL) 0.1 % ophthalmic solution PLACE 1 DROP INTO BOTH EYES 2 TIMES A DAY  Qty: 5 mL, Refills: 5    Associated Diagnoses: Eye irritation      tamsulosin (FLOMAX) 0.4 mg Cap Take 1 capsule (0.4 mg total) by mouth once daily.  Qty: 90 capsule, Refills: 3           STOP taking these medications       sulfamethoxazole-trimethoprim 800-160mg (BACTRIM DS) 800-160 mg Tab Comments:   Reason for Stopping:                 I have seen and examined this patient within the last  30 days. My clinical findings that support the need for the home health skilled services and home bound status are the following:no   Weakness/numbness causing balance and gait disturbance due to Weakness/Debility and R shoulder injury making it taxing to leave home.     Diet:   regular diet    Labs:  None    Referrals/ Consults  Physical Therapy to evaluate and treat. Evaluate for home safety and equipment needs; Establish/upgrade home exercise program. Perform / instruct on therapeutic exercises, gait training, transfer training, and Range of Motion.  Occupational Therapy to evaluate and treat. Evaluate home environment for safety and equipment needs. Perform/Instruct on transfers, ADL training, ROM, and therapeutic exercises.    Activities:   activity as tolerated    Nursing:   Agency to admit patient within 24 hours of hospital discharge unless specified on physician order or at patient request    SN to complete comprehensive assessment including routine vital signs. Instruct on disease process and s/s of complications to report to MD. Review/verify medication list sent home with the patient at time of discharge  and instruct patient/caregiver as needed. Frequency may be adjusted depending on start of care date.     Skilled nurse to perform up to 3 visits PRN for symptoms related to diagnosis    Notify MD if SBP > 160 or < 90; DBP > 90 or < 50; HR > 120 or < 50; Temp > 101; O2 < 88%    Ok to schedule additional visits based on staff availability and patient request on consecutive days within the home health episode.    When multiple disciplines ordered:    Start of Care occurs on Sunday - Wednesday schedule remaining discipline evaluations as ordered on separate consecutive days following the start of care.    Thursday SOC -schedule subsequent evaluations Friday and Monday the following week.     Friday - Saturday SOC - schedule subsequent discipline evaluations on consecutive days starting Monday of the  following week.    For all post-discharge communication and subsequent orders please contact patient's primary care physician. If unable to reach primary care physician or do not receive response within 30 minutes, please contact [facility] for clinical staff order clarification    Miscellaneous   None    Home Health Aide:  Physical Therapy every 48 hours and Occupational Therapy every 48 hours    Wound Care Orders  no    I certify that this patient is confined to his home and needs physical therapy and occupational therapy.
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
## 2022-11-03 NOTE — PT/OT/SLP PROGRESS
"Physical Therapy Treatment    Patient Name:  Jose Morales   MRN:  [id number]    Recommendations:     Discharge Recommendations:  rehabilitation facility   Discharge Equipment Recommendations: none   Barriers to discharge: Inaccessible home    Assessment:     Jose Morales is a 62 y.o. male admitted with a medical diagnosis of SDH (subdural hematoma).  He presents with the following impairments/functional limitations:  pain, decreased ROM, impaired functional mobility, decreased upper extremity function . Pt requires no assist for bed mobility, no assist for transfers, and CGA for gait due to line management. Pt is motivated to progress with functional mobility.     Rehab Prognosis: Good; patient would benefit from acute skilled PT services to address these deficits and reach maximum level of function.    Recent Surgery: * No surgery found *      Plan:     During this hospitalization, patient to be seen 4 x/week to address the identified rehab impairments via gait training, therapeutic activities, therapeutic exercises, neuromuscular re-education and progress toward the following goals:    Plan of Care Expires:  12/01/22    Subjective   "It just hurts when I move"    Pain/Comfort:  Pain Rating 1:  (no pain at rest)  Location - Side 1: Right  Location 1: shoulder  Pain Addressed 1: Reposition, Cessation of Activity  Pain Rating Post-Intervention 1:  (pt c/o pain in his R shoulder with ROM and mobility, unable to grade)      Objective:     Communicated with nurse prior to session.  Patient found HOB elevated with bed alarm, blood pressure cuff, pulse ox (continuous), telemetry upon PT entry to room.     General Precautions: Standard, fall   Orthopedic Precautions:N/A   Braces: N/A  Respiratory Status: Room air     Functional Mobility:  Bed Mobility:     Supine to Sit: modified independence  Transfers:     Sit to Stand:  modified independence with no AD  Gait: 35ft x 3 trials with no AD with CGA to manage lines. Pt " performed standing balance activities with SBA: ambulated backwards, single leg stance, and high knee gait.     AM-PAC 6 CLICK MOBILITY  Turning over in bed (including adjusting bedclothes, sheets and blankets)?: 4  Sitting down on and standing up from a chair with arms (e.g., wheelchair, bedside commode, etc.): 4  Moving from lying on back to sitting on the side of the bed?: 4  Moving to and from a bed to a chair (including a wheelchair)?: 4  Need to walk in hospital room?: 4  Climbing 3-5 steps with a railing?: 3  Basic Mobility Total Score: 23     Treatment & Education:  Pt received AAROM to R shoulder into flex and abd x 5 reps in supine. Pt performed R elbow flex/ext x 10 reps with elbow stabilized.    Patient left up in chair with all lines intact, call button in reach, chair alarm on, and nurse notified..    GOALS:   Multidisciplinary Problems       Physical Therapy Goals          Problem: Physical Therapy    Goal Priority Disciplines Outcome Goal Variances Interventions   Physical Therapy Goal     PT, PT/OT Ongoing, Progressing     Description: PT goals until 11/12/22    1. Pt supine to sit with supervision-met 11/3/22  2. Pt sit to supine with supervision-not met  3. Pt sit to stand with LRAD as needed for safety with SBA-met 11/3/22  4. Pt to perform gait 100ft with LRAD as needed for safety with SBA.-not met  5. Pt to transfer bed to/from bedside chair with SBA.- not met   6. Pt to up/down 3 steps with R UE rail with CGA.-not met  7. Pt to perform B LE exs in sitting or supine x 15 reps to strengthen B LE to improve functional mobility.-not met                        Time Tracking:     PT Received On: 11/03/22  PT Start Time: 1005     PT Stop Time: 1028  PT Total Time (min): 23 min     Billable Minutes: Gait Training 13 and Therapeutic Exercise 10    Treatment Type: Treatment  PT/PTA: PT           11/03/2022

## 2022-11-03 NOTE — PLAN OF CARE
Problem: Physical Therapy  Goal: Physical Therapy Goal  Description: PT goals until 11/12/22    1. Pt supine to sit with supervision-met 11/3/22  2. Pt sit to supine with supervision-not met  3. Pt sit to stand with LRAD as needed for safety with SBA-met 11/3/22  4. Pt to perform gait 100ft with LRAD as needed for safety with SBA.-not met  5. Pt to transfer bed to/from bedside chair with SBA.- not met   6. Pt to up/down 3 steps with R UE rail with CGA.-not met  7. Pt to perform B LE exs in sitting or supine x 15 reps to strengthen B LE to improve functional mobility.-not met   Outcome: Ongoing, Progressing   Pt's goals remain appropriate and pt will continue to benefit from skilled PT services to work towards improved functional mobility including: transfers and gait.   11/3/2022

## 2022-11-03 NOTE — PLAN OF CARE
11/03/2022      Jose Morales  [address]  [place] [address]          [facility] Medicine Dept.  [facility] [address] [place] [id number] [phone] after hours  [phone] fax Diagnosis:  SDH (subdural hematoma)                         Debility    PT and OT - evaluate and treat               _Caprice Proctor MD; NPI:[id number]_____  Caprice Proctor MD  11/03/2022

## 2022-11-03 NOTE — PLAN OF CARE
Attending Provider: Cassie Wang MD    Allergies: No Known Allergies    Isolation: None   Infection: None   Code Status: FULL    Ht: 6' (1.829 m)   Wt: 79.3 kg (174 lb 13.2 oz)    Admission Cmt: None   Principal Problem: SDH (subdural hematoma) [S06.5XAA] More...              [facility] - Neuro Critical Care       Encounter Date: 10/31/2022  4:02 PM    IP Admit Date: 10/31/22   Discharge Date: No discharge date for patient encounter.   Care Everywhere:  [id number]    MRN: [id number]   Guarantor: BRIAN MORALES   Contact Serial #: [id number]   Hospital Account: [id number]       ENCOUNTER          Patient Class: Inpatient   Unit: [facility]*   Hospital Service: Neurosurgery   Bed: 9084 A   Admitting Provider: Charbel Dickson Md   Referring Physician: Self, Aaareferral   Attending Provider: Cassie Wang Md   Adm Diagnosis: Back pain [M54.9]   PATIENT                 Name: GARY MORALESIE : 1960 (62 yrs)   Address: [facility] [address] [facility] Sex: Male   City: [place] [address] SSN:    Primary Care Provider: Primary Doctor No         Primary Phone: [phone]   EMERGENCY CONTACT   Contact Name Legal Guardian? Relationship to Patient Home Phone Work Phone Mobile Phone   1. Sarah Morales  2. Lindsay Morales      Significant other  Friend [phone]   GUARANTOR                  Guarantor: BRIAN MORALES       : 1960   Address: [address] [place], [place] [address]    Sex: Male     RAMYA ORANTES [facility] Guarantor  Type: P/F   Relation to Patient: Self         Home Phone: [phone]   Guarantor ID: [id number]         Work Phone:     GUARANTOR EMPLOYER     Employer:             Status: NOT EMPLO*   COVERAGE        PRIMARY INSURANCE   Payor: MEDICAID Plan: HEALTHY BLUE (AMERIGROUP*   Group Number: [id number] Insurance Type: INDEMNITY   Subscriber Name: LUKASDE,BRIAN Subscriber : 1960   Subscriber ID: [id number] Insurance Address: [address] [place]  [address]   Pat. Rel. to Subscriber: Self       SECONDARY INSURANCE   Payor:   Plan:     Group Number:   Insurance Type:     Subscriber Name:   Subscriber :     Subscriber ID:   Insurance Address:     Pat. Rel. to Subscriber:

## 2022-12-21 ENCOUNTER — TELEPHONE (OUTPATIENT)
Dept: HEPATOLOGY | Facility: CLINIC | Age: 62
End: 2022-12-21
Payer: MEDICAID

## 2022-12-21 NOTE — TELEPHONE ENCOUNTER
HCC screening originally scheduled on 10/13/22; patient was a no-show. Multiple attempts made to reschedule.  I spoke with patient finally and testing scheduled again on 12/2/22.  Patient was a no-show for scheduled hcc screening tests on 12/2/22.  LVM asking that he call hepatology back for rescheduling on 12/7, 12/14 and 12/21.    
Never

## 2022-12-23 ENCOUNTER — TELEPHONE (OUTPATIENT)
Dept: HEPATOLOGY | Facility: CLINIC | Age: 62
End: 2022-12-23
Payer: MEDICAID

## 2022-12-23 DIAGNOSIS — B18.2 HEP C W/O COMA, CHRONIC: Primary | ICD-10-CM

## 2022-12-23 NOTE — TELEPHONE ENCOUNTER
----- Message from Autumn Lassiter RN sent at 12/21/2022 10:37 AM CST -----  Epclusa X 12.  F4 fibrosis.  Multiple attempts made to setup SVR 12 draw ordered 10/13/22 and HCC testing.  Okay to close episode?

## 2023-01-04 ENCOUNTER — TELEPHONE (OUTPATIENT)
Dept: HEPATOLOGY | Facility: CLINIC | Age: 63
End: 2023-01-04
Payer: MEDICAID

## 2023-01-04 NOTE — TELEPHONE ENCOUNTER
Patient was a no-show for scheduled appt with PA Scheuermann on 1/3/23.  He is also overdue for hcc testing and svr 12 draw.  Attempt made to reach patient on 1/3/23 and 1/4/23 regarding rescheduling; lvm.   Letter sent today asking that he call hepatology.

## 2023-03-26 ENCOUNTER — HOSPITAL ENCOUNTER (EMERGENCY)
Facility: HOSPITAL | Age: 63
Discharge: HOME | End: 2023-03-26
Attending: EMERGENCY MEDICINE
Payer: MEDICARE

## 2023-03-26 VITALS
TEMPERATURE: 98 F | RESPIRATION RATE: 16 BRPM | HEIGHT: 72 IN | BODY MASS INDEX: 23.7 KG/M2 | DIASTOLIC BLOOD PRESSURE: 73 MMHG | HEART RATE: 91 BPM | WEIGHT: 175 LBS | SYSTOLIC BLOOD PRESSURE: 130 MMHG | OXYGEN SATURATION: 97 %

## 2023-03-26 DIAGNOSIS — M25.511 RIGHT SHOULDER PAIN: ICD-10-CM

## 2023-03-26 DIAGNOSIS — L03.116 LEFT LEG CELLULITIS: ICD-10-CM

## 2023-03-26 DIAGNOSIS — M25.511 CHRONIC RIGHT SHOULDER PAIN: Primary | ICD-10-CM

## 2023-03-26 DIAGNOSIS — G89.29 CHRONIC RIGHT SHOULDER PAIN: Primary | ICD-10-CM

## 2023-03-26 PROCEDURE — 25000003 PHARM REV CODE 250: Performed by: PHYSICIAN ASSISTANT

## 2023-03-26 PROCEDURE — 99284 EMERGENCY DEPT VISIT MOD MDM: CPT

## 2023-03-26 PROCEDURE — 25000003 PHARM REV CODE 250: Performed by: NURSE PRACTITIONER

## 2023-03-26 RX ORDER — KETOROLAC TROMETHAMINE 10 MG/1
10 TABLET, FILM COATED ORAL
Status: COMPLETED | OUTPATIENT
Start: 2023-03-26 | End: 2023-03-26

## 2023-03-26 RX ORDER — CEPHALEXIN 500 MG/1
500 CAPSULE ORAL
Status: COMPLETED | OUTPATIENT
Start: 2023-03-26 | End: 2023-03-26

## 2023-03-26 RX ORDER — CEPHALEXIN 500 MG/1
500 CAPSULE ORAL EVERY 6 HOURS
Qty: 40 CAPSULE | Refills: 0 | Status: SHIPPED | OUTPATIENT
Start: 2023-03-26 | End: 2023-04-05

## 2023-03-26 RX ORDER — SULINDAC 150 MG/1
150 TABLET ORAL 2 TIMES DAILY
Qty: 10 TABLET | Refills: 0 | Status: SHIPPED | OUTPATIENT
Start: 2023-03-26 | End: 2023-03-31

## 2023-03-26 RX ADMIN — KETOROLAC TROMETHAMINE 10 MG: 10 TABLET, FILM COATED ORAL at 12:03

## 2023-03-26 RX ADMIN — BACITRACIN ZINC, NEOMYCIN, POLYMYXIN B 1 EACH: 400; 3.5; 5 OINTMENT TOPICAL at 01:03

## 2023-03-26 RX ADMIN — CEPHALEXIN 500 MG: 500 CAPSULE ORAL at 01:03

## 2023-03-26 NOTE — FIRST PROVIDER EVALUATION
" Emergency Department TeleTriage Encounter Note      CHIEF COMPLAINT    Chief Complaint   Patient presents with    Shoulder Pain     Right shoulder pain for "a few months". Pt thinks possibly arthritis    Wound Check     Pt also c/o a wound to his left lateral leg "for a few days"       VITAL SIGNS   Initial Vitals [03/26/23 1214]   BP Pulse Resp Temp SpO2   130/73 91 16 97.8 °F (36.6 °C) 97 %      MAP       --            ALLERGIES    Review of patient's allergies indicates:  No Known Allergies    PROVIDER TRIAGE NOTE  Patient presents with pain in the right shoulder worse with movement. No known injury. Also reports infection to lower leg - possible staph      ORDERS  Labs Reviewed - No data to display    ED Orders (720h ago, onward)      None              Virtual Visit Note: The provider triage portion of this emergency department evaluation and documentation was performed via [employer], a HIPAA-compliant telemedicine application, in concert with a tele-presenter in the room. A face to face patient evaluation with one of my colleagues will occur once the patient is placed in an emergency department room.      DISCLAIMER: This note was prepared with [employer] voice recognition transcription software. Garbled syntax, mangled pronouns, and other bizarre constructions may be attributed to that software system.    "

## 2023-03-26 NOTE — DISCHARGE INSTRUCTIONS
You have been prescribed clinoril (sulindac), an anti-inflammatory.  Take this medication whether you feel you need it or not.  Do not take ibuprofen, naproxen or other NSAID's medications while taking this medication.  Take antibiotics as ordered.   You may use Voltaren Gel over the counter as directed on package.  Do not combine this product with any other therapies or products except as directed.       You may use Lidocaine patches over the counter as directed on package.  Do not combine this product with any other therapies or products except as directed.     You may use Capsaicin over the counter as directed on package.  Do not combine this product with any other therapies or products except as directed.

## 2023-03-26 NOTE — ED TRIAGE NOTES
Pt reported to ED for wound check that has been ongoing for months. Wound is red and warm to touch.

## 2023-03-27 NOTE — TELEPHONE ENCOUNTER
Patient LVM today stating that he's ready for scheduling.  Attempt made to reach him to setup f/u visit with PA Scheuermann along with hcc testing and svr 12 quant.  LVM asking that he call hepatology back.
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
## 2024-03-23 ENCOUNTER — HOSPITAL ENCOUNTER (EMERGENCY)
Facility: HOSPITAL | Age: 64
Discharge: HOME | End: 2024-03-23
Attending: EMERGENCY MEDICINE
Payer: MEDICARE

## 2024-03-23 VITALS
DIASTOLIC BLOOD PRESSURE: 74 MMHG | HEIGHT: 72 IN | BODY MASS INDEX: 25.06 KG/M2 | WEIGHT: 185 LBS | SYSTOLIC BLOOD PRESSURE: 136 MMHG | OXYGEN SATURATION: 98 % | RESPIRATION RATE: 18 BRPM | TEMPERATURE: 98 F | HEART RATE: 73 BPM

## 2024-03-23 DIAGNOSIS — R07.9 CHEST PAIN: ICD-10-CM
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
## 2024-03-23 DIAGNOSIS — M79.18 MUSCULOSKELETAL PAIN: ICD-10-CM

## 2024-03-23 DIAGNOSIS — M25.512 ACUTE PAIN OF LEFT SHOULDER: Primary | ICD-10-CM

## 2024-03-23 LAB
ALBUMIN SERPL-MCNC: 3.8 G/DL (ref 3.3–5.5)
ALP SERPL-CCNC: 92 U/L (ref 42–141)
BILIRUB SERPL-MCNC: 0.7 MG/DL (ref 0.2–1.6)
BUN SERPL-MCNC: 9 MG/DL (ref 7–22)
CALCIUM SERPL-MCNC: 9.5 MG/DL (ref 8–10.3)
CHLORIDE SERPL-SCNC: 102 MMOL/L (ref 98–108)
CREAT SERPL-MCNC: 0.7 MG/DL (ref 0.6–1.2)
GLUCOSE SERPL-MCNC: 107 MG/DL (ref 73–118)
HCT, POC: NORMAL
HGB, POC: NORMAL (ref 14–18)
MCH, POC: NORMAL
MCHC, POC: NORMAL
MCV, POC: NORMAL
MPV, POC: NORMAL
POC ALT (SGPT): 18 U/L (ref 10–47)
POC AST (SGOT): 18 U/L (ref 11–38)
POC CARDIAC TROPONIN I: 0 NG/ML (ref 0–0.08)
POC PLATELET COUNT: NORMAL
POC PTINR: 1.2 (ref 0.9–1.2)
POC PTWBT: 14 SEC (ref 9.7–14.3)
POC TCO2: 31 MMOL/L (ref 18–33)
POTASSIUM BLD-SCNC: 4.7 MMOL/L (ref 3.6–5.1)
PROTEIN, POC: 8.5 G/DL (ref 6.4–8.1)
RBC, POC: NORMAL
RDW, POC: NORMAL
SAMPLE: NORMAL
SAMPLE: NORMAL
SODIUM BLD-SCNC: 140 MMOL/L (ref 128–145)
WBC, POC: NORMAL

## 2024-03-23 PROCEDURE — 93010 ELECTROCARDIOGRAM REPORT: CPT | Mod: ,,, | Performed by: INTERNAL MEDICINE

## 2024-03-23 PROCEDURE — 25000003 PHARM REV CODE 250: Mod: ER | Performed by: EMERGENCY MEDICINE

## 2024-03-23 PROCEDURE — 96361 HYDRATE IV INFUSION ADD-ON: CPT | Mod: ER

## 2024-03-23 PROCEDURE — 63600175 PHARM REV CODE 636 W HCPCS: Mod: ER | Performed by: EMERGENCY MEDICINE

## 2024-03-23 PROCEDURE — 84484 ASSAY OF TROPONIN QUANT: CPT | Mod: ER

## 2024-03-23 PROCEDURE — 96374 THER/PROPH/DIAG INJ IV PUSH: CPT | Mod: ER

## 2024-03-23 PROCEDURE — 99285 EMERGENCY DEPT VISIT HI MDM: CPT | Mod: 25,ER

## 2024-03-23 PROCEDURE — 25000242 PHARM REV CODE 250 ALT 637 W/ HCPCS: Mod: ER | Performed by: EMERGENCY MEDICINE

## 2024-03-23 PROCEDURE — 93005 ELECTROCARDIOGRAM TRACING: CPT | Mod: ER

## 2024-03-23 RX ORDER — NITROGLYCERIN 0.4 MG/1
0.4 TABLET SUBLINGUAL
Status: COMPLETED | OUTPATIENT
Start: 2024-03-23 | End: 2024-03-23

## 2024-03-23 RX ORDER — CYCLOBENZAPRINE HCL 10 MG
10 TABLET ORAL 3 TIMES DAILY PRN
Qty: 15 TABLET | Refills: 0 | Status: SHIPPED | OUTPATIENT
Start: 2024-03-23 | End: 2024-03-28

## 2024-03-23 RX ORDER — CYCLOBENZAPRINE HCL 10 MG
10 TABLET ORAL
Status: COMPLETED | OUTPATIENT
Start: 2024-03-23 | End: 2024-03-23

## 2024-03-23 RX ORDER — ACETAMINOPHEN 500 MG
500 TABLET ORAL EVERY 6 HOURS PRN
Qty: 30 TABLET | Refills: 0 | Status: SHIPPED | OUTPATIENT
Start: 2024-03-23

## 2024-03-23 RX ORDER — IBUPROFEN 600 MG/1
600 TABLET ORAL EVERY 6 HOURS PRN
Qty: 20 TABLET | Refills: 0 | Status: SHIPPED | OUTPATIENT
Start: 2024-03-23

## 2024-03-23 RX ORDER — NAPROXEN SODIUM 220 MG/1
81 TABLET, FILM COATED ORAL DAILY
Qty: 30 TABLET | Refills: 0 | Status: SHIPPED | OUTPATIENT
Start: 2024-03-23 | End: 2024-03-23

## 2024-03-23 RX ORDER — KETOROLAC TROMETHAMINE 30 MG/ML
15 INJECTION, SOLUTION INTRAMUSCULAR; INTRAVENOUS
Status: COMPLETED | OUTPATIENT
Start: 2024-03-23 | End: 2024-03-23

## 2024-03-23 RX ORDER — ASPIRIN 325 MG
325 TABLET ORAL
Status: COMPLETED | OUTPATIENT
Start: 2024-03-23 | End: 2024-03-23

## 2024-03-23 RX ORDER — NITROGLYCERIN 0.4 MG/1
0.4 TABLET SUBLINGUAL EVERY 5 MIN PRN
Status: DISCONTINUED | OUTPATIENT
Start: 2024-03-23 | End: 2024-03-23 | Stop reason: HOSPADM

## 2024-03-23 RX ADMIN — NITROGLYCERIN 0.4 MG: 0.4 TABLET, ORALLY DISINTEGRATING SUBLINGUAL at 02:03

## 2024-03-23 RX ADMIN — KETOROLAC TROMETHAMINE 15 MG: 30 INJECTION, SOLUTION INTRAMUSCULAR at 03:03

## 2024-03-23 RX ADMIN — NITROGLYCERIN 0.4 MG: 0.4 TABLET, ORALLY DISINTEGRATING SUBLINGUAL at 01:03

## 2024-03-23 RX ADMIN — ASPIRIN 325 MG ORAL TABLET 325 MG: 325 PILL ORAL at 01:03

## 2024-03-23 RX ADMIN — CYCLOBENZAPRINE 10 MG: 10 TABLET, FILM COATED ORAL at 03:03

## 2024-03-23 NOTE — Clinical Note
"Jose "Roderick Morales was seen and treated in our emergency department on 3/23/2024.  He may return to work on 03/25/2024.       If you have any questions or concerns, please don't hesitate to call.      Natasha Escamilla, DO"
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
## 2024-03-24 LAB
OHS QRS DURATION: 118 MS
OHS QTC CALCULATION: 441 MS

## 2024-08-08 ENCOUNTER — HOSPITAL ENCOUNTER (EMERGENCY)
Facility: HOSPITAL | Age: 64
Discharge: HOME | End: 2024-08-08
Attending: EMERGENCY MEDICINE
Payer: MEDICARE

## 2024-08-08 VITALS
HEART RATE: 69 BPM | TEMPERATURE: 98 F | OXYGEN SATURATION: 100 % | SYSTOLIC BLOOD PRESSURE: 167 MMHG | HEIGHT: 72 IN | RESPIRATION RATE: 18 BRPM | BODY MASS INDEX: 23.03 KG/M2 | DIASTOLIC BLOOD PRESSURE: 81 MMHG | WEIGHT: 170 LBS

## 2024-08-08 DIAGNOSIS — R10.13 EPIGASTRIC ABDOMINAL PAIN: Primary | ICD-10-CM

## 2024-08-08 LAB
ALBUMIN SERPL-MCNC: 4.3 G/DL (ref 3.3–5.5)
ALBUMIN SERPL-MCNC: 4.3 G/DL (ref 3.3–5.5)
ALP SERPL-CCNC: 73 U/L (ref 42–141)
ALP SERPL-CCNC: 75 U/L (ref 42–141)
BILIRUB SERPL-MCNC: 0.7 MG/DL (ref 0.2–1.6)
BILIRUB SERPL-MCNC: 0.7 MG/DL (ref 0.2–1.6)
BILIRUBIN, POC UA: NEGATIVE
BLOOD, POC UA: NEGATIVE
BUN SERPL-MCNC: 11 MG/DL (ref 7–22)
CALCIUM SERPL-MCNC: 10.9 MG/DL (ref 8–10.3)
CHLORIDE SERPL-SCNC: 103 MMOL/L (ref 98–108)
CLARITY, UA POC: CLEAR
COLOR, UA POC: ABNORMAL
CREAT SERPL-MCNC: 0.9 MG/DL (ref 0.6–1.2)
GLUCOSE SERPL-MCNC: 120 MG/DL (ref 73–118)
GLUCOSE, POC UA: NEGATIVE
HCT, POC: NORMAL
HGB, POC: NORMAL (ref 14–18)
KETONES, POC UA: NEGATIVE
LEUKOCYTE EST, POC UA: NEGATIVE
MCH, POC: NORMAL
MCHC, POC: NORMAL
MCV, POC: NORMAL
MPV, POC: NORMAL
NITRITE, POC UA: NEGATIVE
OHS QRS DURATION: 120 MS
OHS QTC CALCULATION: 425 MS
PH UR STRIP: 6.5 [PH]
POC ALT (SGPT): 12 U/L (ref 10–47)
POC ALT (SGPT): 25 U/L (ref 10–47)
POC AMYLASE: 78 U/L (ref 14–97)
POC AST (SGOT): 23 U/L (ref 11–38)
POC AST (SGOT): 24 U/L (ref 11–38)
POC GGT: 11 U/L (ref 5–65)
POC PLATELET COUNT: NORMAL
POC TCO2: 28 MMOL/L (ref 18–33)
POTASSIUM BLD-SCNC: 3.8 MMOL/L (ref 3.6–5.1)
PROTEIN, POC UA: ABNORMAL
PROTEIN, POC: 7.9 G/DL (ref 6.4–8.1)
PROTEIN, POC: 8.1 G/DL (ref 6.4–8.1)
RBC, POC: NORMAL
RDW, POC: NORMAL
SODIUM BLD-SCNC: 139 MMOL/L (ref 128–145)
SPECIFIC GRAVITY, POC UA: 1.02
UROBILINOGEN, POC UA: 0.2 E.U./DL
WBC, POC: NORMAL

## 2024-08-08 PROCEDURE — 85025 COMPLETE CBC W/AUTO DIFF WBC: CPT | Mod: ER

## 2024-08-08 PROCEDURE — 82150 ASSAY OF AMYLASE: CPT | Mod: ER

## 2024-08-08 PROCEDURE — 99284 EMERGENCY DEPT VISIT MOD MDM: CPT | Mod: 25,ER

## 2024-08-08 PROCEDURE — 25000003 PHARM REV CODE 250: Mod: ER

## 2024-08-08 PROCEDURE — 96361 HYDRATE IV INFUSION ADD-ON: CPT | Mod: ER

## 2024-08-08 PROCEDURE — 63600175 PHARM REV CODE 636 W HCPCS: Mod: ER

## 2024-08-08 PROCEDURE — 82040 ASSAY OF SERUM ALBUMIN: CPT | Mod: 59,ER

## 2024-08-08 PROCEDURE — 93010 ELECTROCARDIOGRAM REPORT: CPT | Mod: ,,, | Performed by: INTERNAL MEDICINE

## 2024-08-08 PROCEDURE — 96374 THER/PROPH/DIAG INJ IV PUSH: CPT | Mod: ER

## 2024-08-08 PROCEDURE — 93005 ELECTROCARDIOGRAM TRACING: CPT | Mod: ER

## 2024-08-08 PROCEDURE — 80053 COMPREHEN METABOLIC PANEL: CPT | Mod: ER

## 2024-08-08 RX ORDER — ONDANSETRON HYDROCHLORIDE 2 MG/ML
4 INJECTION, SOLUTION INTRAVENOUS
Status: COMPLETED | OUTPATIENT
Start: 2024-08-08 | End: 2024-08-08

## 2024-08-08 RX ORDER — FAMOTIDINE 20 MG/1
20 TABLET, FILM COATED ORAL 2 TIMES DAILY
Qty: 60 TABLET | Refills: 0 | Status: SHIPPED | OUTPATIENT
Start: 2024-08-08

## 2024-08-08 RX ORDER — ALUMINUM HYDROXIDE, MAGNESIUM HYDROXIDE, AND SIMETHICONE 1200; 120; 1200 MG/30ML; MG/30ML; MG/30ML
30 SUSPENSION ORAL EVERY 6 HOURS PRN
Qty: 769 ML | Refills: 0 | Status: SHIPPED | OUTPATIENT
Start: 2024-08-08 | End: 2025-08-08

## 2024-08-08 RX ORDER — ALUMINUM HYDROXIDE, MAGNESIUM HYDROXIDE, AND SIMETHICONE 1200; 120; 1200 MG/30ML; MG/30ML; MG/30ML
30 SUSPENSION ORAL
Status: COMPLETED | OUTPATIENT
Start: 2024-08-08 | End: 2024-08-08

## 2024-08-08 RX ADMIN — ALUMINUM HYDROXIDE, MAGNESIUM HYDROXIDE, AND SIMETHICONE 30 ML: 200; 200; 20 SUSPENSION ORAL at 08:08

## 2024-08-08 RX ADMIN — SODIUM CHLORIDE 500 ML: 9 INJECTION, SOLUTION INTRAVENOUS at 08:08

## 2024-08-08 RX ADMIN — ONDANSETRON 4 MG: 2 INJECTION INTRAMUSCULAR; INTRAVENOUS at 08:08

## 2024-08-08 NOTE — DISCHARGE INSTRUCTIONS
Please abstain from methamphetamine use.    Thank you for coming to our Emergency Department today. It is important to remember that some problems or medical conditions are difficult to diagnose and may not be found or addressed during your Emergency Department visit.  These conditions often start with non-specific symptoms and can only be diagnosed on follow up visits with your primary care physician or specialist when the symptoms continue or change. Please remember that all medical conditions can change, and we cannot predict how you will be feeling tomorrow or the next day. Return to the ER with any questions/concerns, new/concerning symptoms, worsening or failure to improve.       Be sure to follow up with your primary care doctor and review all labs/imaging/tests that were performed during your ER visit with them. It is very common for us to identify non-emergent incidental findings which must be followed up with your primary care physician.  Some labs/imaging/tests may be outside of the normal range, and require non-emergent follow-up and/or further investigation/treatment/procedures/testing to help diagnose/exclude/prevent complications or other potentially serious medical conditions. Some abnormalities may not have been discussed or addressed during your ER visit. Some lab results may not return during your ER visit but can be accessible by downloading the free [facility] cj or by visiting [email or web address] . It is important for you to review all labs/imaging/tests which are outside of the normal range with your physician.    An ER visit does not replace a primary care visit, and many screening tests or follow-up tests cannot be ordered by an ER doctor or performed by the ER. Some tests may even require pre-approval.    If you do not have a primary care doctor, you may contact the one listed on your discharge paperwork or you may also call the [facility] Appointment Desk at [phone] ,  or [address] [place] [address] at  [phone] to schedule an appointment, or establish care with a primary care doctor or even a specialist and to obtain information about local resources. It is important to your health that you have a primary care doctor.    Please take all medications as directed. We have done our best to select a medication for you that will treat your condition however, all medications may potentially have side-effects and it is impossible to predict which medications may give you side-effects or what those side-effects (if any) those medications may give you.  If you feel that you are having a negative effect or side-effect of any medication you should stop taking those medications immediately and seek medical attention. If you feel that you are having a life-threatening reaction call 911.        Do not drive, swim, climb to height, take a bath, operate heavy machinery, drink alcohol or take potentially sedating medications, sign any legal documents or make any important decisions for 24 hours if you have received any pain medications, sedatives or mood altering drugs during your ER visit or within 24 hours of taking them if they have been prescribed to you.     You can find additional resources for Dentists, hearing aids, durable medical equipment, low cost pharmacies and other resources at [email or web address]
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
## 2024-09-06 ENCOUNTER — HOSPITAL ENCOUNTER (EMERGENCY)
Facility: HOSPITAL | Age: 64
Discharge: HOME | End: 2024-09-06
Attending: EMERGENCY MEDICINE
Payer: MEDICARE

## 2024-09-06 VITALS
SYSTOLIC BLOOD PRESSURE: 175 MMHG | BODY MASS INDEX: 24.11 KG/M2 | OXYGEN SATURATION: 96 % | WEIGHT: 178 LBS | DIASTOLIC BLOOD PRESSURE: 81 MMHG | TEMPERATURE: 98 F | HEART RATE: 65 BPM | HEIGHT: 72 IN | RESPIRATION RATE: 16 BRPM

## 2024-09-06 DIAGNOSIS — M54.2 NECK PAIN: Primary | ICD-10-CM

## 2024-09-06 PROCEDURE — 63600175 PHARM REV CODE 636 W HCPCS: Mod: ER | Performed by: EMERGENCY MEDICINE

## 2024-09-06 PROCEDURE — 96372 THER/PROPH/DIAG INJ SC/IM: CPT | Performed by: EMERGENCY MEDICINE

## 2024-09-06 PROCEDURE — 99284 EMERGENCY DEPT VISIT MOD MDM: CPT | Mod: 25,ER

## 2024-09-06 PROCEDURE — 25000003 PHARM REV CODE 250: Mod: ER | Performed by: EMERGENCY MEDICINE

## 2024-09-06 RX ORDER — METHOCARBAMOL 750 MG/1
1500 TABLET, FILM COATED ORAL
Status: COMPLETED | OUTPATIENT
Start: 2024-09-06 | End: 2024-09-06

## 2024-09-06 RX ORDER — KETOROLAC TROMETHAMINE 10 MG/1
10 TABLET, FILM COATED ORAL EVERY 6 HOURS PRN
Qty: 12 TABLET | Refills: 0 | Status: SHIPPED | OUTPATIENT
Start: 2024-09-06 | End: 2024-09-09

## 2024-09-06 RX ORDER — KETOROLAC TROMETHAMINE 30 MG/ML
30 INJECTION, SOLUTION INTRAMUSCULAR; INTRAVENOUS
Status: COMPLETED | OUTPATIENT
Start: 2024-09-06 | End: 2024-09-06

## 2024-09-06 RX ORDER — DEXAMETHASONE SODIUM PHOSPHATE 4 MG/ML
8 INJECTION, SOLUTION INTRA-ARTICULAR; INTRALESIONAL; INTRAMUSCULAR; INTRAVENOUS; SOFT TISSUE
Status: COMPLETED | OUTPATIENT
Start: 2024-09-06 | End: 2024-09-06

## 2024-09-06 RX ORDER — GABAPENTIN 100 MG/1
CAPSULE ORAL
Qty: 90 CAPSULE | Refills: 0 | Status: SHIPPED | OUTPATIENT
Start: 2024-09-06

## 2024-09-06 RX ORDER — METHOCARBAMOL 750 MG/1
1500 TABLET, FILM COATED ORAL EVERY 6 HOURS
Qty: 24 TABLET | Refills: 0 | Status: SHIPPED | OUTPATIENT
Start: 2024-09-06 | End: 2024-09-09

## 2024-09-06 RX ORDER — PREDNISONE 20 MG/1
40 TABLET ORAL DAILY
Qty: 10 TABLET | Refills: 0 | Status: SHIPPED | OUTPATIENT
Start: 2024-09-06 | End: 2024-09-11

## 2024-09-06 RX ADMIN — DEXAMETHASONE SODIUM PHOSPHATE 8 MG: 4 INJECTION INTRA-ARTICULAR; INTRALESIONAL; INTRAMUSCULAR; INTRAVENOUS; SOFT TISSUE at 01:09

## 2024-09-06 RX ADMIN — METHOCARBAMOL 1500 MG: 750 TABLET ORAL at 01:09

## 2024-09-06 RX ADMIN — KETOROLAC TROMETHAMINE 30 MG: 30 INJECTION, SOLUTION INTRAMUSCULAR at 01:09
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
